# Patient Record
Sex: FEMALE | Race: WHITE | NOT HISPANIC OR LATINO | Employment: FULL TIME | URBAN - METROPOLITAN AREA
[De-identification: names, ages, dates, MRNs, and addresses within clinical notes are randomized per-mention and may not be internally consistent; named-entity substitution may affect disease eponyms.]

---

## 2022-11-27 DIAGNOSIS — G43.109 MIGRAINE WITH AURA AND WITHOUT STATUS MIGRAINOSUS, NOT INTRACTABLE: ICD-10-CM

## 2022-11-28 RX ORDER — GABAPENTIN 100 MG/1
CAPSULE ORAL
Qty: 90 CAPSULE | Refills: 1 | Status: SHIPPED | OUTPATIENT
Start: 2022-11-28

## 2023-01-04 ENCOUNTER — OFFICE VISIT (OUTPATIENT)
Dept: GASTROENTEROLOGY | Facility: CLINIC | Age: 48
End: 2023-01-04

## 2023-01-04 VITALS
HEART RATE: 69 BPM | WEIGHT: 144 LBS | HEIGHT: 64 IN | SYSTOLIC BLOOD PRESSURE: 105 MMHG | DIASTOLIC BLOOD PRESSURE: 68 MMHG | OXYGEN SATURATION: 98 % | TEMPERATURE: 97.9 F | BODY MASS INDEX: 24.59 KG/M2

## 2023-01-04 DIAGNOSIS — Z12.12 ENCOUNTER FOR COLORECTAL CANCER SCREENING: ICD-10-CM

## 2023-01-04 DIAGNOSIS — Z12.11 ENCOUNTER FOR COLORECTAL CANCER SCREENING: ICD-10-CM

## 2023-01-04 DIAGNOSIS — K21.9 GASTROESOPHAGEAL REFLUX DISEASE WITHOUT ESOPHAGITIS: ICD-10-CM

## 2023-01-04 DIAGNOSIS — Z83.71 FAMILY HISTORY OF COLONIC POLYPS: ICD-10-CM

## 2023-01-04 DIAGNOSIS — K59.00 CONSTIPATION, UNSPECIFIED CONSTIPATION TYPE: Primary | ICD-10-CM

## 2023-01-04 PROBLEM — Z83.719 FAMILY HISTORY OF COLONIC POLYPS: Status: ACTIVE | Noted: 2023-01-04

## 2023-01-04 RX ORDER — LINACLOTIDE 145 UG/1
1 CAPSULE, GELATIN COATED ORAL DAILY
Qty: 30 CAPSULE | Refills: 5 | Status: SHIPPED | OUTPATIENT
Start: 2023-01-04 | End: 2023-02-03

## 2023-01-04 NOTE — ASSESSMENT & PLAN NOTE
Gastroesophageal reflux disease - Patient has the symptoms of chronic acid reflux for a long time  Possible hiatal hernia or LES weakness  Should rule out Lockhart's esophagus because of chronic symptoms         -Patient was explained about the lifestyle and dietary modifications  Advised to avoid fatty foods, chocolates, caffeine, alcohol and any other triggering foods  Avoid eating for at least 3 hours before going to bed          -  Schedule for EGD    - may continue with Pepcid

## 2023-01-04 NOTE — ASSESSMENT & PLAN NOTE
Patient with history of chronic constipation which appear to be idiopathic and secondary to IBS  Patient tried MiraLax and fiber supplements without any help  -  Will try Linzess 145 mcg daily and adjust the dose depending on the results     -Schedule for colonoscopy  -High-fiber diet and take plenty of liquids     -Patient was given instructions about the colonoscopy prep     -Patient was explained about  the risks and benefits of the procedure  Risks including but not limited to bleeding, infection, perforation were explained in detail  Also explained about less than 100% sensitivity with the exam and other alternatives

## 2023-01-04 NOTE — PATIENT INSTRUCTIONS
Scheduled date of EGD/colonoscopy (as of today): 02/10/23  Physician performing EGD/colonoscopy: JERSEY  [facility]  Desired bowel prep reviewed with patient: GOLYTELY  Instructions reviewed with patient by: VIRGINIA  Clearances: RENE

## 2023-01-04 NOTE — PROGRESS NOTES
Consultation - [facility]) [facility]  Brenda Vazquezenrico 1975 female         Chief Complaint:   GERD, for colonoscopy    HPI:   78-year-old female with history of depression, GERD was referred for colonoscopy  Patient had colonoscopy many years ago  Her sister had colon polyps and grandfather had colon cancer  She has problems with chronic constipation and reports having bowel movements only once a week  She tried MiraLax, stool softeners and fiber supplements without any help  She gives history of chronic acid reflux and takes Pepcid OTC  Denies any difficulty swallowing  Good appetite, no recent weight loss  Denies abdominal pain, nausea or vomiting  Chaperon: Ms Lui Sierra: Review of Systems   Constitutional: Negative for activity change, appetite change, chills, diaphoresis, fatigue, fever and unexpected weight change  HENT: Negative for ear discharge, ear pain, facial swelling, hearing loss, nosebleeds, sore throat, tinnitus and voice change  Eyes: Negative for pain, discharge, redness, itching and visual disturbance  Respiratory: Negative for apnea, cough, chest tightness, shortness of breath and wheezing  Cardiovascular: Negative for chest pain and palpitations  Gastrointestinal:        As noted in HPI   Endocrine: Negative for cold intolerance, heat intolerance and polyuria  Genitourinary: Negative for difficulty urinating, dysuria, flank pain, hematuria and urgency  Musculoskeletal: Negative for arthralgias, back pain, gait problem, joint swelling and myalgias  Skin: Negative for rash and wound  Neurological: Positive for headaches  Negative for dizziness, tremors, seizures, speech difficulty, light-headedness and numbness  Hematological: Negative for adenopathy  Does not bruise/bleed easily  Psychiatric/Behavioral: Negative for agitation, behavioral problems and confusion  The patient is not nervous/anxious           Past Medical History: Diagnosis Date   • Headache    • Hyperlipidemia    • Migraine    • Psychiatric disorder    • Suicide and self-inflicted injury [facility])       Past Surgical History:   Procedure Laterality Date   • NO PAST SURGERIES       Social History     Socioeconomic History   • Marital status: /Civil Union     Spouse name: Not on file   • Number of children: Not on file   • Years of education: Not on file   • Highest education level: Not on file   Occupational History   • Not on file   Tobacco Use   • Smoking status: Never   • Smokeless tobacco: Never   Vaping Use   • Vaping Use: Never used   Substance and Sexual Activity   • Alcohol use: Yes     Comment: rare   • Drug use: No   • Sexual activity: Not on file   Other Topics Concern   • Not on file   Social History Narrative   • Not on file     Social Determinants of Health     Financial Resource Strain: Not on file   Food Insecurity: Not on file   Transportation Needs: Not on file   Physical Activity: Not on file   Stress: Not on file   Social Connections: Not on file   Intimate Partner Violence: Not on file   Housing Stability: Not on file     Family History   Problem Relation Age of Onset   • COPD Mother    • Heart disease Father    • Cancer Maternal Grandmother    • Cancer Maternal Grandfather    • Colon cancer Maternal Grandfather    • Cancer Maternal Aunt      Patient has no known allergies    Current Outpatient Medications   Medication Sig Dispense Refill   • bisacodyl (DULCOLAX) 5 mg EC tablet Take 2 tablets (10 mg total) by mouth once for 1 dose 2 tablet 0   • lamoTRIgine (LaMICtal) 25 MG CHEW      • linaCLOtide (Linzess) 145 MCG CAPS Take 1 capsule (145 mcg total) by mouth in the morning 30 capsule 5   • LORazepam (ATIVAN) 1 mg tablet Take 1 mg by mouth 2 (two) times a day       • polyethylene glycol (GOLYTELY) 4000 mL solution Take 4,000 mL by mouth once for 1 dose 4000 mL 0   • SUMAtriptan (IMITREX) 100 mg tablet TAKE 1 TABLET BY MOUTH AT ONSET, MAY REPEAT IN 2 HOURS IF NEEDED  DO NOT TAKE MORE THAN 2 TABLETS IN 24 HOURS 9 tablet 6     No current facility-administered medications for this visit  Blood pressure 105/68, pulse 69, temperature 97 9 °F (36 6 °C), height 5' 3 5" (1 613 m), weight 65 3 kg (144 lb), SpO2 98 %, not currently breastfeeding  PHYSICAL EXAM: Physical Exam  Constitutional:       Appearance: Normal appearance  She is well-developed  HENT:      Head: Normocephalic and atraumatic  Nose: Nose normal    Eyes:      Conjunctiva/sclera: Conjunctivae normal    Neck:      Thyroid: No thyromegaly  Vascular: No JVD  Trachea: No tracheal deviation  Cardiovascular:      Rate and Rhythm: Normal rate and regular rhythm  Heart sounds: Normal heart sounds  No murmur heard  No friction rub  No gallop  Pulmonary:      Effort: Pulmonary effort is normal  No respiratory distress  Breath sounds: Normal breath sounds  No wheezing or rales  Abdominal:      General: Bowel sounds are normal  There is no distension  Palpations: Abdomen is soft  There is no mass  Tenderness: There is no abdominal tenderness  There is no guarding  Hernia: No hernia is present  Musculoskeletal:         General: No tenderness or deformity  Cervical back: Neck supple  Right lower leg: No edema  Left lower leg: No edema  Lymphadenopathy:      Cervical: No cervical adenopathy  Skin:     General: Skin is warm and dry  Findings: No erythema or rash  Neurological:      Mental Status: She is alert and oriented to person, place, and time  Psychiatric:         Mood and Affect: Mood normal          Behavior: Behavior normal          Thought Content:  Thought content normal           Lab Results   Component Value Date    WBC 6 0 09/27/2021    HGB 12 1 09/27/2021    HCT 37 1 09/27/2021    MCV 82 09/27/2021     09/27/2021     Lab Results   Component Value Date    CALCIUM 9 2 05/15/2020    K 4 1 09/27/2021    CO2 21 09/27/2021     (H) 09/27/2021    BUN 15 09/27/2021    CREATININE 0 67 09/27/2021     Lab Results   Component Value Date    ALT 8 09/27/2021    AST 14 09/27/2021    ALKPHOS 42 (L) 05/15/2020     Lab Results   Component Value Date    INR 0 98 08/06/2017    PROTIME 10 3 08/06/2017       No results found  ASSESSMENT & PLAN:    Gastroesophageal reflux disease without esophagitis    Gastroesophageal reflux disease - Patient has the symptoms of chronic acid reflux for a long time  Possible hiatal hernia or LES weakness  Should rule out Lockhart's esophagus because of chronic symptoms         -Patient was explained about the lifestyle and dietary modifications  Advised to avoid fatty foods, chocolates, caffeine, alcohol and any other triggering foods  Avoid eating for at least 3 hours before going to bed  -  Schedule for EGD    - may continue with Pepcid    Constipation   Patient with history of chronic constipation which appear to be idiopathic and secondary to IBS  Patient tried MiraLax and fiber supplements without any help  -  Will try Linzess 145 mcg daily and adjust the dose depending on the results     -Schedule for colonoscopy  -High-fiber diet and take plenty of liquids     -Patient was given instructions about the colonoscopy prep     -Patient was explained about  the risks and benefits of the procedure  Risks including but not limited to bleeding, infection, perforation were explained in detail  Also explained about less than 100% sensitivity with the exam and other alternatives  Family history of colonic polyps   Patient is at increased risks because of family history of colon polyps      -  Colonoscopy

## 2023-01-22 DIAGNOSIS — G43.109 MIGRAINE WITH AURA AND WITHOUT STATUS MIGRAINOSUS, NOT INTRACTABLE: ICD-10-CM

## 2023-01-23 RX ORDER — SUMATRIPTAN 100 MG/1
TABLET, FILM COATED ORAL
Qty: 9 TABLET | Refills: 0 | Status: SHIPPED | OUTPATIENT
Start: 2023-01-23

## 2023-02-08 ENCOUNTER — TELEPHONE (OUTPATIENT)
Dept: GASTROENTEROLOGY | Facility: CLINIC | Age: 48
End: 2023-02-08

## 2023-02-08 NOTE — TELEPHONE ENCOUNTER
Scheduled date of EGD/colonoscopy (as of today):3/14/23  Physician performing EGD/colonoscopy:DR BROWN  Location of EGD/colonoscopy:[facility]  Clearances:  NA    PT RESCHEDULED TO THE ABOVE DATE   WAS UNABLE TO GET A HOLD OF ANYONE AT THE [facility] TO NOTIFY OF CANCELLATION ON 2/10

## 2023-02-09 ENCOUNTER — TELEPHONE (OUTPATIENT)
Dept: GASTROENTEROLOGY | Facility: AMBULARY SURGERY CENTER | Age: 48
End: 2023-02-09

## 2023-03-02 DIAGNOSIS — G43.109 MIGRAINE WITH AURA AND WITHOUT STATUS MIGRAINOSUS, NOT INTRACTABLE: ICD-10-CM

## 2023-03-02 RX ORDER — SUMATRIPTAN 100 MG/1
TABLET, FILM COATED ORAL
Qty: 9 TABLET | Refills: 0 | Status: SHIPPED | OUTPATIENT
Start: 2023-03-02

## 2023-03-13 RX ORDER — SODIUM CHLORIDE, SODIUM LACTATE, POTASSIUM CHLORIDE, CALCIUM CHLORIDE 600; 310; 30; 20 MG/100ML; MG/100ML; MG/100ML; MG/100ML
75 INJECTION, SOLUTION INTRAVENOUS CONTINUOUS
Status: CANCELLED | OUTPATIENT
Start: 2023-03-13

## 2023-03-14 ENCOUNTER — ANESTHESIA EVENT (OUTPATIENT)
Dept: GASTROENTEROLOGY | Facility: AMBULARY SURGERY CENTER | Age: 48
End: 2023-03-14

## 2023-03-14 ENCOUNTER — HOSPITAL ENCOUNTER (OUTPATIENT)
Dept: GASTROENTEROLOGY | Facility: AMBULARY SURGERY CENTER | Age: 48
Setting detail: OUTPATIENT SURGERY
Discharge: HOME | End: 2023-03-14
Attending: INTERNAL MEDICINE

## 2023-03-14 ENCOUNTER — ANESTHESIA (OUTPATIENT)
Dept: GASTROENTEROLOGY | Facility: AMBULARY SURGERY CENTER | Age: 48
End: 2023-03-14

## 2023-03-14 VITALS
SYSTOLIC BLOOD PRESSURE: 116 MMHG | HEART RATE: 61 BPM | RESPIRATION RATE: 18 BRPM | TEMPERATURE: 97.5 F | OXYGEN SATURATION: 94 % | DIASTOLIC BLOOD PRESSURE: 71 MMHG

## 2023-03-14 DIAGNOSIS — K59.00 CONSTIPATION, UNSPECIFIED CONSTIPATION TYPE: ICD-10-CM

## 2023-03-14 DIAGNOSIS — K21.9 GASTROESOPHAGEAL REFLUX DISEASE WITHOUT ESOPHAGITIS: ICD-10-CM

## 2023-03-14 DIAGNOSIS — Z83.71 FAMILY HISTORY OF COLONIC POLYPS: ICD-10-CM

## 2023-03-14 LAB
EXT PREGNANCY TEST URINE: NEGATIVE
EXT. CONTROL: NORMAL

## 2023-03-14 RX ORDER — SODIUM CHLORIDE, SODIUM LACTATE, POTASSIUM CHLORIDE, CALCIUM CHLORIDE 600; 310; 30; 20 MG/100ML; MG/100ML; MG/100ML; MG/100ML
75 INJECTION, SOLUTION INTRAVENOUS CONTINUOUS
Status: DISCONTINUED | OUTPATIENT
Start: 2023-03-14 | End: 2023-03-18 | Stop reason: HOSPADM

## 2023-03-14 RX ORDER — LIDOCAINE HYDROCHLORIDE 10 MG/ML
INJECTION, SOLUTION EPIDURAL; INFILTRATION; INTRACAUDAL; PERINEURAL AS NEEDED
Status: DISCONTINUED | OUTPATIENT
Start: 2023-03-14 | End: 2023-03-14

## 2023-03-14 RX ORDER — PANTOPRAZOLE SODIUM 40 MG/1
40 TABLET, DELAYED RELEASE ORAL DAILY
Qty: 30 TABLET | Refills: 2 | Status: SHIPPED | OUTPATIENT
Start: 2023-03-14

## 2023-03-14 RX ORDER — PROPOFOL 10 MG/ML
INJECTION, EMULSION INTRAVENOUS AS NEEDED
Status: DISCONTINUED | OUTPATIENT
Start: 2023-03-14 | End: 2023-03-14

## 2023-03-14 RX ADMIN — PROPOFOL 140 MG: 10 INJECTION, EMULSION INTRAVENOUS at 11:56

## 2023-03-14 RX ADMIN — LIDOCAINE HYDROCHLORIDE 50 MG: 10 INJECTION, SOLUTION EPIDURAL; INFILTRATION; INTRACAUDAL; PERINEURAL at 11:56

## 2023-03-14 RX ADMIN — PROPOFOL 50 MG: 10 INJECTION, EMULSION INTRAVENOUS at 12:03

## 2023-03-14 RX ADMIN — SODIUM CHLORIDE, SODIUM LACTATE, POTASSIUM CHLORIDE, AND CALCIUM CHLORIDE: .6; .31; .03; .02 INJECTION, SOLUTION INTRAVENOUS at 11:23

## 2023-03-14 RX ADMIN — PROPOFOL 50 MG: 10 INJECTION, EMULSION INTRAVENOUS at 12:11

## 2023-03-14 RX ADMIN — PROPOFOL 50 MG: 10 INJECTION, EMULSION INTRAVENOUS at 12:00

## 2023-03-14 NOTE — ANESTHESIA PREPROCEDURE EVALUATION
Procedure:  COLONOSCOPY  EGD    Relevant Problems   ANESTHESIA (within normal limits)   (-) History of anesthesia complications      CARDIO   (+) Elevated triglycerides with high cholesterol   (+) Migraine with aura and without status migrainosus, not intractable   (+) Mixed hyperlipidemia      ENDO (within normal limits)      GI/HEPATIC   (+) Gastroesophageal reflux disease without esophagitis      /RENAL (within normal limits)      GYN (within normal limits)      HEMATOLOGY (within normal limits)      MUSCULOSKELETAL (within normal limits)      NEURO/PSYCH   (+) Depression with anxiety   (+) Migraine with aura and without status migrainosus, not intractable      PULMONARY (within normal limits)      Endocrine   (+) MTHFR mutation      Nervous and Auditory   (+) Sensorineural hearing loss (SNHL), bilateral        Physical Exam    Airway    Mallampati score: II  TM Distance: >3 FB  Neck ROM: full     Dental   No notable dental hx     Cardiovascular  Rhythm: regular, Rate: normal, Cardiovascular exam normal    Pulmonary  Pulmonary exam normal Breath sounds clear to auscultation,     Other Findings        Anesthesia Plan  ASA Score- 2     Anesthesia Type- IV sedation with anesthesia with ASA Monitors  Additional Monitors:   Airway Plan:           Plan Factors-Exercise tolerance (METS): >4 METS  Chart reviewed  EKG reviewed  Imaging results reviewed  Existing labs reviewed  Patient summary reviewed  Patient is not a current smoker  Patient did not smoke on day of surgery  Induction- intravenous  Postoperative Plan-     Informed Consent- Anesthetic plan and risks discussed with patient  I personally reviewed this patient with the CRNA  Discussed and agreed on the Anesthesia Plan with the CRNA           NPO verified  NKDA  Urine pregnancy test negative today, 3/14/23      Plan:  IV sedation/MAC, GA as backup    Benefits and risks of sedation were discussed with the patient including possibility of recall under sedation and the potential for conversion to general anesthesia if necessary  All questions were answered  Anesthesia consent was obtained from the patient

## 2023-03-14 NOTE — H&P
History and Physical -  [facility]  Anayarikelley Monge 50 y o  female MRN: [id number]        HPI: 47-year-old female with history of GERD has issues with chronic constipation  Her sister had colon polyps  Historical Information   Past Medical History:   Diagnosis Date   • Constipation    • Family history of colonic polyps    • GERD (gastroesophageal reflux disease)    • Headache    • Hyperlipidemia    • Migraine    • Psychiatric disorder    • Suicide and self-inflicted injury [facility])      Past Surgical History:   Procedure Laterality Date   • COLONOSCOPY     • NO PAST SURGERIES       Social History   Social History     Substance and Sexual Activity   Alcohol Use Not Currently    Comment: rare     Social History     Substance and Sexual Activity   Drug Use No     Social History     Tobacco Use   Smoking Status Never   Smokeless Tobacco Never     Family History   Problem Relation Age of Onset   • COPD Mother    • Heart disease Father    • Cancer Maternal Grandmother    • Cancer Maternal Grandfather    • Colon cancer Maternal Grandfather    • Cancer Maternal Aunt        Meds/Allergies     (Not in a hospital admission)      No Known Allergies    Objective     Last menstrual period 02/24/2023, not currently breastfeeding      PHYSICAL EXAM:    Gen: NAD  CV: S1 & S2 normal, RRR  CHEST: Clear to auscultate  ABD: soft, NT/ND, good bowel sounds  EXT: no edema    ASSESSMENT:     GERD, constipation, family history of colon polyps    PLAN:    EGD and colonoscopy

## 2023-03-28 DIAGNOSIS — A04.8 H. PYLORI INFECTION: Primary | ICD-10-CM

## 2023-03-28 RX ORDER — PANTOPRAZOLE SODIUM 40 MG/1
40 TABLET, DELAYED RELEASE ORAL
Qty: 28 TABLET | Refills: 0 | Status: SHIPPED | OUTPATIENT
Start: 2023-03-28 | End: 2023-04-11

## 2023-03-28 RX ORDER — CLARITHROMYCIN 500 MG/1
500 TABLET, COATED ORAL EVERY 12 HOURS SCHEDULED
Qty: 28 TABLET | Refills: 0 | Status: SHIPPED | OUTPATIENT
Start: 2023-03-28 | End: 2023-04-11

## 2023-03-28 RX ORDER — AMOXICILLIN 500 MG/1
1000 CAPSULE ORAL EVERY 12 HOURS SCHEDULED
Qty: 56 CAPSULE | Refills: 0 | Status: SHIPPED | OUTPATIENT
Start: 2023-03-28 | End: 2023-04-11

## 2023-03-28 NOTE — TELEPHONE ENCOUNTER
Spoke with patient, reviewed biopsy results and recommendations  Explained h pylori, antibx regimen and to have stool sample submitted 1 month after finishing antibx and to hold PPI for 2 weeks prior  Patient verbalized understanding, no further questions

## 2023-03-28 NOTE — TELEPHONE ENCOUNTER
----- Message from Antony Montoya MD sent at 3/27/2023  6:07 PM EDT -----  RN to call pt    Gastric biopsies came back as positive for H  pylori infection   Patient to be treated with triple therapy, and then check stool for H  pylori antigen in couple of months off PPI

## 2023-03-28 NOTE — TELEPHONE ENCOUNTER
Patient returning missed phone call  Call transferred to [facility] at the ph # provided via Teams

## 2023-04-06 ENCOUNTER — NURSE TRIAGE (OUTPATIENT)
Dept: OTHER | Facility: OTHER | Age: 48
End: 2023-04-06

## 2023-04-06 NOTE — TELEPHONE ENCOUNTER
"Regarding: Yeast Infection  ----- Message from Jennifer Feng sent at 4/6/2023  9:24 AM EDT -----  \" I am taking an antibiotic for H Pylori, I now have a Yeast infection  Can I get the medication for the Yeast infection  \"    "

## 2023-04-06 NOTE — TELEPHONE ENCOUNTER
Spoke with patient, advised she has to follow up with ob/gyn or PCP for medication for yeast infection we do not prescribe that  She has appt tomorrow with PCP  No further questions

## 2023-04-06 NOTE — TELEPHONE ENCOUNTER
"Patient currently being treated for H pylori infections with Amoxicillin and Clarithromycin since 3/28/23  Patient reports vaginal itching and whitish discharge starting Tuesday 4/4  Patient reports using OTC medication with no relief and is requesting oral antibiotic for yeast infection  Contact with PCP and GYN requires OV  Please follow up with patient  Reason for Disposition  • Caller has NON-URGENT question and triager unable to answer question    Answer Assessment - Initial Assessment Questions  1  INFECTION: \"What infection is the antibiotic being given for? \"      H pylori  2  ANTIBIOTIC: \"What antibiotic are you taking\" \"How many times per day? \"      Amoxicillin 500 mg and Clarithromycin 500 mg every 12 hours  3  DURATION: \"When was the antibiotic started? \"      Start 3/28 for 14 days  4  MAIN CONCERN OR SYMPTOM:  \"What is your main concern right now? \"      Signs and symptoms of yeast infection with  Itching and wihitish discharge  5  BETTER-SAME-WORSE: Roselind Orange you getting better, staying the same, or getting worse compared to when you first started the antibiotics? \" If getting worse, ask: \"In what way? \"       Better  6  FEVER: \"Do you have a fever? \" If Yes, ask: \"What is your temperature, how was it measured, and when did it start? \"     Denies  7  SYMPTOMS: \"Are there any other symptoms you're concerned about? \" If Yes, ask: \"When did it start? \"      Started Tuesday 4/4; tried OTC with no results  8  FOLLOW-UP APPOINTMENT: \"Do you have a follow-up appointment with your doctor? \"      Not asked    Protocols used: INFECTION ON ANTIBIOTIC FOLLOW-UP CALL-ADULT-[place]    "

## 2023-04-07 ENCOUNTER — OFFICE VISIT (OUTPATIENT)
Dept: FAMILY MEDICINE CLINIC | Facility: CLINIC | Age: 48
End: 2023-04-07

## 2023-04-07 VITALS
BODY MASS INDEX: 24.21 KG/M2 | RESPIRATION RATE: 18 BRPM | HEART RATE: 76 BPM | DIASTOLIC BLOOD PRESSURE: 74 MMHG | WEIGHT: 141.8 LBS | TEMPERATURE: 98.1 F | SYSTOLIC BLOOD PRESSURE: 110 MMHG | HEIGHT: 64 IN

## 2023-04-07 DIAGNOSIS — R55 SYNCOPE, UNSPECIFIED SYNCOPE TYPE: ICD-10-CM

## 2023-04-07 DIAGNOSIS — G43.109 MIGRAINE WITH AURA AND WITHOUT STATUS MIGRAINOSUS, NOT INTRACTABLE: ICD-10-CM

## 2023-04-07 DIAGNOSIS — Z12.31 ENCOUNTER FOR SCREENING MAMMOGRAM FOR MALIGNANT NEOPLASM OF BREAST: ICD-10-CM

## 2023-04-07 DIAGNOSIS — Z00.00 ANNUAL PHYSICAL EXAM: Primary | ICD-10-CM

## 2023-04-07 DIAGNOSIS — R43.1 ABNORMAL SMELL: ICD-10-CM

## 2023-04-07 DIAGNOSIS — B37.31 YEAST VAGINITIS: ICD-10-CM

## 2023-04-07 RX ORDER — SUMATRIPTAN 100 MG/1
TABLET, FILM COATED ORAL
Qty: 9 TABLET | Refills: 6 | Status: SHIPPED | OUTPATIENT
Start: 2023-04-07

## 2023-04-07 RX ORDER — FLUCONAZOLE 150 MG/1
150 TABLET ORAL
Qty: 2 TABLET | Refills: 0 | Status: SHIPPED | OUTPATIENT
Start: 2023-04-07 | End: 2023-04-11

## 2023-04-07 NOTE — PROGRESS NOTES
Chief Complaint   Patient presents with   • Annual Exam   • Vaginitis        Patient ID: Ramiro Funk is a 50 y o  female  HPI  Pt is seeing for annual PE  -  UTD with Gyn exams     The following portions of the patient's history were reviewed and updated as appropriate: allergies, current medications, past family history, past medical history, past social history, past surgical history and problem list     Review of Systems   Constitutional: Negative for fatigue, fever and unexpected weight change  HENT: Negative for congestion, ear discharge, ear pain, hearing loss, postnasal drip, rhinorrhea, sinus pressure, sore throat and trouble swallowing  Episodes of abnormal smell  -  Started 2 m ago -  Normal CT head    Eyes: Negative  Respiratory: Negative  Cardiovascular: Negative  Gastrointestinal: Negative for abdominal pain, blood in stool, constipation, diarrhea, nausea and vomiting  Frequent heartburns    Endocrine: Negative  Genitourinary: Positive for vaginal discharge (after AB use  -  tried Monistat OTC with minimal help)  Musculoskeletal: Negative  Skin: Negative  Neurological: Positive for syncope (4 random episodes in last 2 y -  no prior w/u ) and headaches (has migraines )  Negative for dizziness, weakness, light-headedness and numbness  Hematological: Negative  Psychiatric/Behavioral: Negative          Current Outpatient Medications   Medication Sig Dispense Refill   • amoxicillin (AMOXIL) 500 mg capsule Take 2 capsules (1,000 mg total) by mouth every 12 (twelve) hours for 14 days 56 capsule 0   • clarithromycin (BIAXIN) 500 mg tablet Take 1 tablet (500 mg total) by mouth every 12 (twelve) hours for 14 days 28 tablet 0   • lamoTRIgine (LaMICtal) 25 MG CHEW daily at bedtime     • LORazepam (ATIVAN) 1 mg tablet Take 1 mg by mouth 2 (two) times a day       • pantoprazole (PROTONIX) 40 mg tablet Take 1 tablet (40 mg total) by mouth daily 30 tablet 2   • SUMAtriptan "(IMITREX) 100 mg tablet TAKE 1 TABLET BY MOUTH AT ONSET, MAY REPEAT IN 2 HOURS IF NEEDED  DO NOT TAKE MORE THAN 2 TABLETS IN 24 HOURS 9 tablet 0   • bisacodyl (DULCOLAX) 5 mg EC tablet Take 2 tablets (10 mg total) by mouth once for 1 dose 2 tablet 0   • pantoprazole (PROTONIX) 40 mg tablet Take 1 tablet (40 mg total) by mouth 2 (two) times a day before meals for 14 days (Patient not taking: Reported on 4/7/2023) 28 tablet 0   • polyethylene glycol (GOLYTELY) 4000 mL solution Take 4,000 mL by mouth once for 1 dose 4000 mL 0     No current facility-administered medications for this visit  Objective:    /74 (BP Location: Left arm, Patient Position: Sitting, Cuff Size: Standard)   Pulse 76   Temp 98 1 °F (36 7 °C)   Resp 18   Ht 5' 3 5\" (1 613 m)   Wt 64 3 kg (141 lb 12 8 oz)   BMI 24 72 kg/m²        Physical Exam  Constitutional:       General: She is not in acute distress  Appearance: She is well-developed  She is not ill-appearing  HENT:      Head: Normocephalic and atraumatic  Right Ear: Hearing, tympanic membrane, ear canal and external ear normal       Left Ear: Hearing, tympanic membrane, ear canal and external ear normal       Nose: No congestion or rhinorrhea  Mouth/Throat:      Pharynx: No oropharyngeal exudate or posterior oropharyngeal erythema  Eyes:      Extraocular Movements: Extraocular movements intact  Conjunctiva/sclera: Conjunctivae normal    Neck:      Thyroid: No thyroid mass or thyromegaly  Vascular: No JVD  Cardiovascular:      Rate and Rhythm: Normal rate and regular rhythm  Heart sounds: Normal heart sounds  No murmur heard  No gallop  Pulmonary:      Effort: No respiratory distress  Breath sounds: Normal breath sounds  No wheezing, rhonchi or rales  Abdominal:      General: Bowel sounds are normal       Palpations: Abdomen is soft  Tenderness: There is no abdominal tenderness   There is no right CVA tenderness or left CVA " tenderness  Musculoskeletal:      Cervical back: Normal range of motion and neck supple  Right lower leg: No edema  Left lower leg: No edema  Lymphadenopathy:      Cervical: No cervical adenopathy  Skin:     Coloration: Skin is not pale  Findings: No rash  Neurological:      General: No focal deficit present  Mental Status: She is alert and oriented to person, place, and time  Cranial Nerves: No cranial nerve deficit  Psychiatric:         Mood and Affect: Mood normal          Behavior: Behavior normal          Thought Content: Thought content normal          Judgment: Judgment normal                  Assessment/Plan:         Diagnoses and all orders for this visit:    Annual physical exam  -     CBC; Future  -     Comprehensive metabolic panel; Future  -     Lipid panel; Future  -     TSH, 3rd generation; Future  -     Hemoglobin A1C; Future  -     CBC  -     Comprehensive metabolic panel  -     Lipid panel  -     TSH, 3rd generation  -     Hemoglobin A1C    Encounter for screening mammogram for malignant neoplasm of breast  -     Mammo screening bilateral w 3d & cad; Future    Yeast vaginitis  -     fluconazole (DIFLUCAN) 150 mg tablet; Take 1 tablet (150 mg total) by mouth every third day for 2 doses    Migraine with aura and without status migrainosus, not intractable  -     SUMAtriptan (IMITREX) 100 mg tablet; TAKE 1 TABLET BY MOUTH AT ONSET, MAY REPEAT IN 2 HOURS IF NEEDED  DO NOT TAKE MORE THAN 2 TABLETS IN 24 HOURS    Abnormal smell  -     Ambulatory Referral to Otolaryngology; Future    Syncope, unspecified syncope type  -     Ambulatory Referral to Neurology; Future  -     Ambulatory Referral to Cardiology;  Future        rto in 1 y                 Leslie Mclean MD

## 2023-04-26 LAB
ALBUMIN SERPL-MCNC: 4.5 G/DL (ref 3.8–4.8)
ALBUMIN/GLOB SERPL: 2.1 {RATIO} (ref 1.2–2.2)
ALP SERPL-CCNC: 39 IU/L (ref 44–121)
ALT SERPL-CCNC: 9 IU/L (ref 0–32)
AST SERPL-CCNC: 18 IU/L (ref 0–40)
BILIRUB SERPL-MCNC: 0.3 MG/DL (ref 0–1.2)
BUN SERPL-MCNC: 18 MG/DL (ref 6–24)
BUN/CREAT SERPL: 25 (ref 9–23)
CALCIUM SERPL-MCNC: 9.3 MG/DL (ref 8.7–10.2)
CHLORIDE SERPL-SCNC: 108 MMOL/L (ref 96–106)
CHOLEST SERPL-MCNC: 236 MG/DL (ref 100–199)
CHOLEST/HDLC SERPL: 3.4 RATIO (ref 0–4.4)
CO2 SERPL-SCNC: 25 MMOL/L (ref 20–29)
CREAT SERPL-MCNC: 0.71 MG/DL (ref 0.57–1)
EGFR: 104 ML/MIN/1.73
ERYTHROCYTE [DISTWIDTH] IN BLOOD BY AUTOMATED COUNT: 12.3 % (ref 11.7–15.4)
EST. AVERAGE GLUCOSE BLD GHB EST-MCNC: 103 MG/DL
GLOBULIN SER-MCNC: 2.1 G/DL (ref 1.5–4.5)
GLUCOSE SERPL-MCNC: 90 MG/DL (ref 70–99)
HBA1C MFR BLD: 5.2 % (ref 4.8–5.6)
HCT VFR BLD AUTO: 37.3 % (ref 34–46.6)
HDLC SERPL-MCNC: 69 MG/DL
HGB BLD-MCNC: 12 G/DL (ref 11.1–15.9)
LDLC SERPL CALC-MCNC: 154 MG/DL (ref 0–99)
MCH RBC QN AUTO: 27.2 PG (ref 26.6–33)
MCHC RBC AUTO-ENTMCNC: 32.2 G/DL (ref 31.5–35.7)
MCV RBC AUTO: 85 FL (ref 79–97)
MICRODELETION SYND BLD/T FISH: NORMAL
PLATELET # BLD AUTO: 220 X10E3/UL (ref 150–450)
POTASSIUM SERPL-SCNC: 4.2 MMOL/L (ref 3.5–5.2)
PROT SERPL-MCNC: 6.6 G/DL (ref 6–8.5)
RBC # BLD AUTO: 4.41 X10E6/UL (ref 3.77–5.28)
SL AMB VLDL CHOLESTEROL CALC: 13 MG/DL (ref 5–40)
SODIUM SERPL-SCNC: 144 MMOL/L (ref 134–144)
TRIGL SERPL-MCNC: 77 MG/DL (ref 0–149)
TSH SERPL DL<=0.005 MIU/L-ACNC: 1.51 UIU/ML (ref 0.45–4.5)
WBC # BLD AUTO: 4.9 X10E3/UL (ref 3.4–10.8)

## 2023-05-25 ENCOUNTER — OFFICE VISIT (OUTPATIENT)
Dept: URGENT CARE | Facility: CLINIC | Age: 48
End: 2023-05-25

## 2023-05-25 ENCOUNTER — APPOINTMENT (OUTPATIENT)
Dept: RADIOLOGY | Facility: CLINIC | Age: 48
End: 2023-05-25

## 2023-05-25 VITALS
OXYGEN SATURATION: 98 % | HEIGHT: 64 IN | HEART RATE: 68 BPM | WEIGHT: 142 LBS | TEMPERATURE: 97.6 F | SYSTOLIC BLOOD PRESSURE: 119 MMHG | DIASTOLIC BLOOD PRESSURE: 66 MMHG | RESPIRATION RATE: 15 BRPM | BODY MASS INDEX: 24.24 KG/M2

## 2023-05-25 DIAGNOSIS — M79.671 RIGHT FOOT PAIN: ICD-10-CM

## 2023-05-25 DIAGNOSIS — M79.671 RIGHT FOOT PAIN: Primary | ICD-10-CM

## 2023-05-25 NOTE — PATIENT INSTRUCTIONS
--Initial read of x-ray negative for fracture  Will call with final results when obtained (anticipate 1-12 hours)  --Rest, avoid excess walking/hiking    --Cast shoe as during walking for the next 1-2 weeks  --Ice, elevate, ACE wrap as needed  --Aleve as needed  Consider also OTC Voltaren gel  --Follow-up with podiatry for ongoing issues over the next 1-2 weeks  May benefit from course of PT and/or injection  Go to ER for worsening

## 2023-05-25 NOTE — PROGRESS NOTES
[facility]        NAME: Celio Perkins is a 50 y o  female  : 1975    MRN: [id number]  DATE: May 25, 2023  TIME: 6:20 PM    Assessment and Plan   Right foot pain [M79 671]  1  Right foot pain  XR foot 3+ vw right    Ambulatory referral to Podiatry        --Suspected extensor digitorum longus tendonitis vs  midfoot sprain  Address per below  Patient Instructions     --Initial read of x-ray negative for fracture  Will call with final results when obtained (anticipate 1-12 hours)  --Rest, avoid excess walking/hiking    --Cast/ortho shoe as during walking for the next 1-2 weeks  --Ice, elevate, ACE wrap as needed  --Aleve as needed  Consider also OTC Voltaren gel  --Follow-up with podiatry for ongoing issues over the next 1-2 weeks  May benefit from course of PT and/or injection  Go to ER for worsening  Chief Complaint     Chief Complaint   Patient presents with   • Foot Pain     Pt is reporting right foot pain and edema- she denies any injury and she stated that they went hiking and noticed it was increasing in pain  History of Present Illness       Here with complaints of right foot pain x 2 weeks  Started shortly after going on 7 mile hike  No known injury or trauma  Wearing comfortable hiking shoes  Has continued since then, despite no further long hikes  Does walk regularly throughout the day, however  Wears comfortable sneakers  No boots or steel-toed shoes  Dorsal aspect of midfoot only with no radiation elsewhere including forefoot, arch, ankle, heel, Achilles  Mild swelling at times  Possible bruising, not sure  Rates pain 5/10 at present, 7/10 with weight bearing  Partial relief from Aleve, ice  Left foot feels fine  Denies past foot injuries/issues  Review of Systems   Review of Systems   Constitutional: Negative for fever  Musculoskeletal:        Per HPI   Neurological: Negative for numbness           Current Medications "      Current Outpatient Medications:   •  bisacodyl (DULCOLAX) 5 mg EC tablet, Take 2 tablets (10 mg total) by mouth once for 1 dose, Disp: 2 tablet, Rfl: 0  •  lamoTRIgine (LaMICtal) 25 MG CHEW, daily at bedtime, Disp: , Rfl:   •  LORazepam (ATIVAN) 1 mg tablet, Take 1 mg by mouth 2 (two) times a day  , Disp: , Rfl:   •  pantoprazole (PROTONIX) 40 mg tablet, Take 1 tablet (40 mg total) by mouth daily, Disp: 30 tablet, Rfl: 2  •  pantoprazole (PROTONIX) 40 mg tablet, Take 1 tablet (40 mg total) by mouth 2 (two) times a day before meals for 14 days (Patient not taking: Reported on 4/7/2023), Disp: 28 tablet, Rfl: 0  •  polyethylene glycol (GOLYTELY) 4000 mL solution, Take 4,000 mL by mouth once for 1 dose, Disp: 4000 mL, Rfl: 0  •  SUMAtriptan (IMITREX) 100 mg tablet, TAKE 1 TABLET BY MOUTH AT ONSET, MAY REPEAT IN 2 HOURS IF NEEDED  DO NOT TAKE MORE THAN 2 TABLETS IN 24 HOURS, Disp: 9 tablet, Rfl: 6    Current Allergies     Allergies as of 05/25/2023   • (No Known Allergies)            The following portions of the patient's history were reviewed and updated as appropriate: allergies, current medications, past family history, past medical history, past social history, past surgical history and problem list      Past Medical History:   Diagnosis Date   • Constipation    • Family history of colonic polyps    • GERD (gastroesophageal reflux disease)    • Headache    • Hyperlipidemia    • Migraine    • Psychiatric disorder    • Suicide and self-inflicted injury [facility])        Past Surgical History:   Procedure Laterality Date   • COLONOSCOPY     • NO PAST SURGERIES         Family History   Problem Relation Age of Onset   • COPD Mother    • Heart disease Father    • Cancer Maternal Grandmother    • Cancer Maternal Grandfather    • Colon cancer Maternal Grandfather    • Cancer Maternal Aunt          Medications have been verified          Objective   /66   Pulse 68   Temp 97 6 °F (36 4 °C)   Resp 15   Ht 5' 3 5\" " (1 613 m)   Wt 64 4 kg (142 lb)   SpO2 98%   BMI 24 76 kg/m²   No LMP recorded  Physical Exam     Physical Exam  Pulmonary:      Effort: Pulmonary effort is normal    Musculoskeletal:         General: Swelling and tenderness present  No deformity  Normal range of motion  Comments: Right foot TTP overlying dorsal aspect of midfoot (2nd-4th metatarsals primarily) with associated trace swelling, no bruising  No palpable abnormality  Remainder of right foot and ankle non-tender with normal appearance including first and 5th metatarsals, navicular  Normal ankle AROM with some pain at limits of plantar flexion and with resisted dorsiflexion  Toes with normal temp, color, sensation  Minimally antalgic gait  Neurological:      Mental Status: She is alert  Psychiatric:         Mood and Affect: Mood normal          Orthopedic injury treatment    Date/Time: 5/25/2023 5:30 PM    Performed by: JAGDEEP Hernández  Authorized by: JAGDEEP Hernández    Patient Location:  [facility]  [place] Protocol:  Procedure performed by:  Consent: Verbal consent obtained  Risks and benefits: risks, benefits and alternatives were discussed  Consent given by: patient  Patient understanding: patient states understanding of the procedure being performed  Patient consent: the patient's understanding of the procedure matches consent given  Procedure consent: procedure consent matches procedure scheduled  Required items: required blood products, implants, devices, and special equipment available  Patient identity confirmed: verbally with patient      Injury location:  Foot  Location details:  Right foot  Injury type:   Soft tissue  Neurovascular status: Neurovascularly intact    Distal perfusion: normal    Neurological function: normal    Range of motion: normal    Local anesthesia used?: No    Immobilization:  Ace wrap (Cast/ortho shoe)  Neurovascular status: Neurovascularly intact    Distal perfusion: normal Neurological function: normal    Range of motion: unchanged    Patient tolerance:  Patient tolerated the procedure well with no immediate complications

## 2023-05-31 DIAGNOSIS — Z12.31 ENCOUNTER FOR SCREENING MAMMOGRAM FOR MALIGNANT NEOPLASM OF BREAST: ICD-10-CM

## 2023-06-06 ENCOUNTER — CONSULT (OUTPATIENT)
Dept: CARDIOLOGY CLINIC | Facility: CLINIC | Age: 48
End: 2023-06-06
Payer: COMMERCIAL

## 2023-06-06 VITALS
HEIGHT: 64 IN | HEART RATE: 71 BPM | WEIGHT: 146 LBS | OXYGEN SATURATION: 98 % | BODY MASS INDEX: 24.92 KG/M2 | SYSTOLIC BLOOD PRESSURE: 98 MMHG | DIASTOLIC BLOOD PRESSURE: 64 MMHG

## 2023-06-06 DIAGNOSIS — E78.2 MIXED HYPERLIPIDEMIA: ICD-10-CM

## 2023-06-06 DIAGNOSIS — Z15.89 MTHFR MUTATION: ICD-10-CM

## 2023-06-06 DIAGNOSIS — K21.9 GASTROESOPHAGEAL REFLUX DISEASE WITHOUT ESOPHAGITIS: ICD-10-CM

## 2023-06-06 DIAGNOSIS — R55 SYNCOPE, UNSPECIFIED SYNCOPE TYPE: Primary | ICD-10-CM

## 2023-06-06 PROCEDURE — 99214 OFFICE O/P EST MOD 30 MIN: CPT | Performed by: INTERNAL MEDICINE

## 2023-06-06 PROCEDURE — 93000 ELECTROCARDIOGRAM COMPLETE: CPT | Performed by: INTERNAL MEDICINE

## 2023-06-06 RX ORDER — ROSUVASTATIN CALCIUM 10 MG/1
10 TABLET, COATED ORAL
Qty: 90 TABLET | Refills: 1 | Status: SHIPPED | OUTPATIENT
Start: 2023-06-06

## 2023-06-06 RX ORDER — PANTOPRAZOLE SODIUM 40 MG/1
TABLET, DELAYED RELEASE ORAL
Qty: 30 TABLET | Refills: 2 | Status: SHIPPED | OUTPATIENT
Start: 2023-06-06

## 2023-06-06 NOTE — PROGRESS NOTES
[facility]  [address] [place] [address] [address]   [place], [address]  Cardiology Consultation    Clarence Ricardo  [id number]  1975      Consult for:  Appreciate consult by: Catrachito Savage MD    1  Syncope, unspecified syncope type  POCT ECG    Ambulatory Referral to Cardiology    Compression Stocking    rosuvastatin (CRESTOR) 10 MG tablet    Lipid Panel with Direct LDL reflex    Lipid Panel with Direct LDL reflex      2  MTHFR mutation  POCT ECG      3  Mixed hyperlipidemia  POCT ECG    rosuvastatin (CRESTOR) 10 MG tablet    Lipid Panel with Direct LDL reflex    Lipid Panel with Direct LDL reflex         Discussion/Summary:   Syncope-she has a classic prodrome of feeling unwell, tunnel vision prior to episodes  Possible vasovagal  very infrequent events  Her resting twelve-lead EKG shows normal sinus rhythm without acute ST-T wave changes  Normal auscultatory exam examination  Her orthostatics here were negative  We discussed about behavior modifications  She will continue to hydrate herself  She will wear compression when sitting and standing  She will eat small meals throughout the day  Cannot completely rule out postictal symptoms-possible neurologic? She does have a history of migraines  Possible complex migraines? Hyperlipidemia-her LDL is elevated  She had a prior advanced lipid panel  We will target an LDL below 70  She will restart rosuvastatin 10 mg    HPI:   42-year-old with a history of hyperlipidemia presents with periodic syncopal episodes  She states having infrequent episodes of feeling unwell, tunnel vision, palpitation and then quick syncope  Her partner states she is staring out and knows it is coming  No prior history of seizures  Denies it associated with migraines  Denies having chest heaviness  She exercises daily without limitations  Denies having exertional shortness of breath  Denies PND orthopnea      Past Medical History: Diagnosis Date   • Constipation    • Family history of colonic polyps    • GERD (gastroesophageal reflux disease)    • Headache    • Hyperlipidemia    • Migraine    • Psychiatric disorder    • Suicide and self-inflicted injury [facility])      Social History     Socioeconomic History   • Marital status: /Civil Union     Spouse name: Not on file   • Number of children: Not on file   • Years of education: Not on file   • Highest education level: Not on file   Occupational History   • Not on file   Tobacco Use   • Smoking status: Never   • Smokeless tobacco: Never   Vaping Use   • Vaping Use: Never used   Substance and Sexual Activity   • Alcohol use: Not Currently     Comment: rare   • Drug use: No   • Sexual activity: Not on file   Other Topics Concern   • Not on file   Social History Narrative   • Not on file     Social Determinants of Health     Financial Resource Strain: Not on file   Food Insecurity: Not on file   Transportation Needs: Not on file   Physical Activity: Not on file   Stress: Not on file   Social Connections: Not on file   Intimate Partner Violence: Not on file   Housing Stability: Not on file      Family History   Problem Relation Age of Onset   • COPD Mother    • Heart disease Father    • Cancer Maternal Grandmother    • Cancer Maternal Grandfather    • Colon cancer Maternal Grandfather    • Cancer Maternal Aunt      Past Surgical History:   Procedure Laterality Date   • COLONOSCOPY     • NO PAST SURGERIES         Current Outpatient Medications:   •  lamoTRIgine (LaMICtal) 25 MG CHEW, daily at bedtime, Disp: , Rfl:   •  LORazepam (ATIVAN) 1 mg tablet, Take 1 mg by mouth 2 (two) times a day  , Disp: , Rfl:   •  SUMAtriptan (IMITREX) 100 mg tablet, TAKE 1 TABLET BY MOUTH AT ONSET, MAY REPEAT IN 2 HOURS IF NEEDED   DO NOT TAKE MORE THAN 2 TABLETS IN 24 HOURS, Disp: 9 tablet, Rfl: 6  •  bisacodyl (DULCOLAX) 5 mg EC tablet, Take 2 tablets (10 mg total) by mouth once for 1 dose, Disp: 2 tablet, Rfl: "0  •  pantoprazole (PROTONIX) 40 mg tablet, Take 1 tablet (40 mg total) by mouth 2 (two) times a day before meals for 14 days (Patient not taking: Reported on 4/7/2023), Disp: 28 tablet, Rfl: 0  •  pantoprazole (PROTONIX) 40 mg tablet, TAKE ONE TABLET BY MOUTH EVERY DAY (Patient not taking: Reported on 6/6/2023), Disp: 30 tablet, Rfl: 2  •  polyethylene glycol (GOLYTELY) 4000 mL solution, Take 4,000 mL by mouth once for 1 dose, Disp: 4000 mL, Rfl: 0  No Known Allergies  Vitals:    06/06/23 1534 06/06/23 1542 06/06/23 1545   BP: 108/62 98/68 98/64   BP Location: Left arm Left arm Left arm   Patient Position: Supine Sitting Standing   Cuff Size: Standard Standard Standard   Pulse: 63 65 71   SpO2: 98%     Weight: 66 2 kg (146 lb)     Height: 5' 3 5\" (1 613 m)         Review of Systems:   Review of Systems   Constitutional: Negative  Negative for activity change, appetite change, chills, diaphoresis, fatigue, fever and unexpected weight change  HENT: Negative  Negative for congestion, dental problem, drooling, ear discharge, ear pain, facial swelling, hearing loss, mouth sores, nosebleeds, postnasal drip, rhinorrhea, sinus pressure, sinus pain, sneezing, sore throat, tinnitus, trouble swallowing and voice change  Eyes: Negative  Negative for photophobia, pain, redness, itching and visual disturbance  Respiratory: Negative  Negative for apnea, cough, choking, chest tightness, shortness of breath, wheezing and stridor  Cardiovascular: Negative  Negative for chest pain, palpitations and leg swelling  Gastrointestinal: Negative  Negative for abdominal distention, abdominal pain, anal bleeding, blood in stool, constipation, diarrhea, nausea, rectal pain and vomiting  Endocrine: Negative  Negative for cold intolerance, heat intolerance, polydipsia, polyphagia and polyuria  Genitourinary: Negative    Negative for decreased urine volume, difficulty urinating, dyspareunia, dysuria, enuresis, flank pain, " "frequency, genital sores, hematuria, menstrual problem, pelvic pain, urgency, vaginal bleeding, vaginal discharge and vaginal pain  Musculoskeletal: Negative  Negative for arthralgias, back pain, gait problem, joint swelling, myalgias, neck pain and neck stiffness  Skin: Negative  Negative for color change, pallor, rash and wound  Allergic/Immunologic: Negative  Negative for environmental allergies, food allergies and immunocompromised state  Neurological: Positive for syncope  Negative for dizziness, tremors, seizures, facial asymmetry, speech difficulty, weakness, light-headedness, numbness and headaches  Hematological: Negative  Negative for adenopathy  Does not bruise/bleed easily  Psychiatric/Behavioral: Negative  Negative for agitation, behavioral problems, confusion, decreased concentration, dysphoric mood, hallucinations, self-injury, sleep disturbance and suicidal ideas  The patient is not nervous/anxious and is not hyperactive  All other systems reviewed and are negative  Vitals:    06/06/23 1534 06/06/23 1542 06/06/23 1545   BP: 108/62 98/68 98/64   BP Location: Left arm Left arm Left arm   Patient Position: Supine Sitting Standing   Cuff Size: Standard Standard Standard   Pulse: 63 65 71   SpO2: 98%     Weight: 66 2 kg (146 lb)     Height: 5' 3 5\" (1 613 m)       Physical Examination:   Physical Exam  Constitutional:       General: She is not in acute distress  Appearance: She is well-developed  She is not diaphoretic  HENT:      Head: Normocephalic and atraumatic  Right Ear: External ear normal       Left Ear: External ear normal    Eyes:      General: No scleral icterus  Right eye: No discharge  Left eye: No discharge  Conjunctiva/sclera: Conjunctivae normal       Pupils: Pupils are equal, round, and reactive to light  Neck:      Thyroid: No thyromegaly  Vascular: No JVD  Trachea: No tracheal deviation     Cardiovascular:      Rate and " "Rhythm: Normal rate and regular rhythm  Heart sounds: No murmur heard  No friction rub  No gallop  Pulmonary:      Effort: Pulmonary effort is normal  No respiratory distress  Breath sounds: Normal breath sounds  No stridor  No wheezing or rales  Chest:      Chest wall: No tenderness  Abdominal:      General: Bowel sounds are normal  There is no distension  Palpations: Abdomen is soft  There is no mass  Tenderness: There is no abdominal tenderness  There is no guarding or rebound  Musculoskeletal:         General: No tenderness or deformity  Normal range of motion  Cervical back: Normal range of motion and neck supple  Skin:     General: Skin is warm and dry  Coloration: Skin is not pale  Findings: No erythema or rash  Neurological:      Mental Status: She is alert and oriented to person, place, and time  Cranial Nerves: No cranial nerve deficit  Motor: No abnormal muscle tone  Coordination: Coordination normal       Deep Tendon Reflexes: Reflexes are normal and symmetric  Reflexes normal    Psychiatric:         Behavior: Behavior normal          Thought Content:  Thought content normal          Judgment: Judgment normal          Labs:     Lab Results   Component Value Date    HCT 37 3 04/25/2023    HGB 12 0 04/25/2023    MCV 85 04/25/2023     04/25/2023    RDW 12 3 04/25/2023    WBC 4 9 04/25/2023     BMP:  Lab Results   Component Value Date    BUN 18 04/25/2023    CALCIUM 9 2 05/15/2020     (H) 04/25/2023    CO2 25 04/25/2023    CREATININE 0 71 04/25/2023    EGFR 104 04/25/2023    GLUC 90 04/25/2023    K 4 2 04/25/2023    MG 1 8 06/28/2019    SODIUM 144 04/25/2023     LFT:  Lab Results   Component Value Date    ALB 4 5 04/25/2023    ALKPHOS 42 (L) 05/15/2020    ALT 9 04/25/2023    AST 18 04/25/2023    TP 6 6 04/25/2023      No results found for: \"[id number]\"  Lab Results   Component Value Date    HGBA1C 5 2 04/25/2023     Lipid " "Profile:   Lab Results   Component Value Date    CHOLESTEROL 236 (H) 04/25/2023    HDL 69 04/25/2023    LDLCALC 154 (H) 04/25/2023    TRIG 77 04/25/2023     Lab Results   Component Value Date    CHOLESTEROL 236 (H) 04/25/2023    CHOLESTEROL 218 (H) 03/15/2021     Lab Results   Component Value Date    CKTOTAL 44 08/06/2017    TROPONINI <0 02 09/17/2019       Imaging & Testing   I have personally reviewed pertinent reports  Cardiac Testing       EKG: Personally reviewed  Normal sinus rhythm no acute ST-T wave changes      Agata Clemens MD [facility]  [phone]  Please call with any questions or suggestions    Counseling :  A description of the counseling:   Goals and Barriers:  Patient's ability to self care:  Medication side effect reviewed with patient in detail and all their questions answered  \"Portions of the record may have been created with voice recognition software  Occasional wrong word or \"sound a like\" substitutions may have occurred due to the inherent limitations of voice recognition software  Read the chart carefully and recognize, using context, where substitutions have occurred  Please call if you have any questions   \"    "

## 2023-06-12 ENCOUNTER — OFFICE VISIT (OUTPATIENT)
Dept: OTOLARYNGOLOGY | Facility: CLINIC | Age: 48
End: 2023-06-12
Payer: COMMERCIAL

## 2023-06-12 VITALS — TEMPERATURE: 97.8 F | HEIGHT: 64 IN | WEIGHT: 146 LBS | BODY MASS INDEX: 24.92 KG/M2

## 2023-06-12 DIAGNOSIS — R43.1 ABNORMAL SMELL: ICD-10-CM

## 2023-06-12 PROCEDURE — 31231 NASAL ENDOSCOPY DX: CPT | Performed by: STUDENT IN AN ORGANIZED HEALTH CARE EDUCATION/TRAINING PROGRAM

## 2023-06-12 PROCEDURE — 99203 OFFICE O/P NEW LOW 30 MIN: CPT | Performed by: STUDENT IN AN ORGANIZED HEALTH CARE EDUCATION/TRAINING PROGRAM

## 2023-06-12 NOTE — PROGRESS NOTES
[facility]  [facility] [address] [facility]  Otolaryngology -- Head and Neck Surgery New Patient Visit  Mayra Chung is a 50 y o  who presents with a chief complaint of     Here for phantoms smells for 1 year  Post COVID19  deniedf nasal blockage, chronic nasal drainage, post nasal drip, facial pressure, poor smell, chronic cough and frequent throat clearing,  sneezing, itchy eyes and nose or nasal bleeding  There is no history of nose or sinus surgeries  Also no history of bronchial asthma  no recent CT scan sinuses  no allergy skin testing          Review of systems: Pertinent review of systems documented in the HPI  10 point ROS documented in a separate note, as necessary  Results reviewed; images from any scan have been personally reviewed: The past medical, surgical, social and family history have been reviewed as documented in today's record    Past Medical History:   Diagnosis Date   • Constipation    • Family history of colonic polyps    • GERD (gastroesophageal reflux disease)    • Headache    • Headache(784 0)    • HL (hearing loss)    • Hyperlipidemia    • Migraine    • Psychiatric disorder    • Suicide and self-inflicted injury [facility])      Past Surgical History:   Procedure Laterality Date   • COLONOSCOPY     • NO PAST SURGERIES       Family History   Problem Relation Age of Onset   • COPD Mother    • Heart disease Father    • Cancer Maternal Grandmother    • Cancer Maternal Grandfather    • Colon cancer Maternal Grandfather    • Cancer Maternal Aunt      Current Outpatient Medications on File Prior to Visit   Medication Sig Dispense Refill   • lamoTRIgine (LaMICtal) 25 MG CHEW daily at bedtime     • LORazepam (ATIVAN) 1 mg tablet Take 1 mg by mouth 2 (two) times a day       • rosuvastatin (CRESTOR) 10 MG tablet Take 1 tablet (10 mg total) by mouth daily at bedtime 90 tablet 1   • SUMAtriptan (IMITREX) 100 mg tablet TAKE 1 TABLET BY MOUTH AT ONSET, MAY "REPEAT IN 2 HOURS IF NEEDED  DO NOT TAKE MORE THAN 2 TABLETS IN 24 HOURS 9 tablet 6   • pantoprazole (PROTONIX) 40 mg tablet TAKE ONE TABLET BY MOUTH EVERY DAY (Patient not taking: Reported on 6/6/2023) 30 tablet 2   • polyethylene glycol (GOLYTELY) 4000 mL solution Take 4,000 mL by mouth once for 1 dose 4000 mL 0   • [DISCONTINUED] bisacodyl (DULCOLAX) 5 mg EC tablet Take 2 tablets (10 mg total) by mouth once for 1 dose 2 tablet 0   • [DISCONTINUED] pantoprazole (PROTONIX) 40 mg tablet Take 1 tablet (40 mg total) by mouth 2 (two) times a day before meals for 14 days (Patient not taking: Reported on 4/7/2023) 28 tablet 0     No current facility-administered medications on file prior to visit  Physical exam:   Temp 97 8 °F (36 6 °C) (Temporal)   Ht 5' 3 5\" (1 613 m)   Wt 66 2 kg (146 lb)   BMI 25 46 kg/m²   Head: Atraumatic, no visible scalp lesions, parotid and submandibular salivary glands non-tender to palpation and without masses bilaterally  Neck:  No visible or palpable cervical lesions or lymphadenopathy, thyroid gland is normal in size and symmetry and without masses, normal laryngeal elevation with swallowing  Ears:    Right ear :  Auricle normal in appearance, mastoid prominence non-tender, external auditory canal clear  Tympanic membranes intact  Left ear :  Auricle normal in appearance, mastoid prominence non-tender, external auditory canal clear   Tympanic membranes intact  Nose/Sinuses:  External appearance unremarkable, no maxillary or frontal sinus tenderness to palpation bilaterally  Nasal endoscopic examination showed deviated nasal septum, bilateral enlarged inferior turbinates, no polyps, thick clear mucus, middle, superior meatus and sphenoethmoid recess clear  Oral Cavity:  Moist mucus membranes, gums and dentition unremarkable, no oral mucosal masses or lesions, floor of mouth soft, tongue mobile without masses or lesions     Oropharynx:  Base of tongue soft and without " masses, tonsils bilaterally unremarkable, soft palate mucosa unremarkable  Eyes:  Extra-ocular movements intact, pupils equally round and reactive to light and accommodation, the lids and conjunctivae are normal in appearance  Constitutional:  Well developed, well nourished and groomed, in no acute distress  Cardiovascular:  Normal rate and rhythm, no palpable thrills, no jugulovenous distension observed  Respiratory:  Normal respiratory effort without evidence of retractions or use of accessory muscles  Neurologic:  Cranial nerves II-XII intact bilaterally  Abdomen: Soft and lax  Extremities: No bruises   Psychiatric:  Alert and oriented to time, place and person  Procedures  Rigid nasal endoscopic examination:  The nasal cavities were decongested with lidocaine and oxymetazoline spray  Bilateral nasal endoscopy was performed as follows:  Endoscopy type: 0 degree rigid scope  Results: look above  The patient tolerated the procedure well  Assessment:   1  Abnormal smell  Ambulatory Referral to Otolaryngology        Orders  No orders of the defined types were placed in this encounter  Discussion/Plan:  She has been experiencing a phantom smell, which I believe is likely to be a result of a previous COVID-19 infection  Nasal endoscopy, which showed no presence of polyps or signs of chronic rhinosinusitis (CRS)   Considering these findings, I recommend smell training as a potential treatment

## 2023-08-15 ENCOUNTER — OFFICE VISIT (OUTPATIENT)
Dept: URGENT CARE | Facility: CLINIC | Age: 48
End: 2023-08-15
Payer: COMMERCIAL

## 2023-08-15 VITALS
SYSTOLIC BLOOD PRESSURE: 108 MMHG | DIASTOLIC BLOOD PRESSURE: 55 MMHG | TEMPERATURE: 98.5 F | RESPIRATION RATE: 16 BRPM | HEART RATE: 67 BPM | OXYGEN SATURATION: 98 %

## 2023-08-15 DIAGNOSIS — R39.15 URINARY URGENCY: Primary | ICD-10-CM

## 2023-08-15 DIAGNOSIS — R30.0 DYSURIA: ICD-10-CM

## 2023-08-15 LAB
SL AMB  POCT GLUCOSE, UA: ABNORMAL
SL AMB LEUKOCYTE ESTERASE,UA: ABNORMAL
SL AMB POCT BILIRUBIN,UA: ABNORMAL
SL AMB POCT BLOOD,UA: ABNORMAL
SL AMB POCT CLARITY,UA: CLEAR
SL AMB POCT COLOR,UA: YELLOW
SL AMB POCT KETONES,UA: ABNORMAL
SL AMB POCT NITRITE,UA: ABNORMAL
SL AMB POCT PH,UA: 5
SL AMB POCT SPECIFIC GRAVITY,UA: 1020
SL AMB POCT URINE PROTEIN: ABNORMAL
SL AMB POCT UROBILINOGEN: 1

## 2023-08-15 PROCEDURE — 87086 URINE CULTURE/COLONY COUNT: CPT | Performed by: NURSE PRACTITIONER

## 2023-08-15 PROCEDURE — 81002 URINALYSIS NONAUTO W/O SCOPE: CPT | Performed by: NURSE PRACTITIONER

## 2023-08-15 PROCEDURE — G0382 LEV 3 HOSP TYPE B ED VISIT: HCPCS | Performed by: NURSE PRACTITIONER

## 2023-08-15 RX ORDER — NITROFURANTOIN 25; 75 MG/1; MG/1
100 CAPSULE ORAL 2 TIMES DAILY
Qty: 10 CAPSULE | Refills: 0 | Status: SHIPPED | OUTPATIENT
Start: 2023-08-15 | End: 2023-08-20

## 2023-08-15 NOTE — PATIENT INSTRUCTIONS
--Symptoms suggestive of possible UTI (bladder infection). Will treat presumptively for now while awaiting urine culture results (anticipate 48-72 hours)-->will call. --Frequent fluids  --Take antibiotic as prescribed  --Seek re-evaluation if no improvement/worsening over the next 24-48 hours with these measures. Follow-up with urologist if urine culture negative with ongoing symptoms.

## 2023-08-15 NOTE — PROGRESS NOTES
[facility]        NAME: Liz Bellamy is a 50 y.o. female  : 1975    MRN: [id number]  DATE: August 15, 2023  TIME: 5:42 PM    Assessment and Plan   Urinary urgency [R39.15]  1. Urinary urgency  Urine culture    nitrofurantoin (MACROBID) 100 mg capsule    Ambulatory referral to Urology      2. Dysuria  POCT urine dip            Patient Instructions     --Symptoms suggestive of possible UTI (bladder infection). Will treat presumptively for now while awaiting urine culture results (anticipate 48-72 hours)-->will call. --Frequent fluids  --Take antibiotic as prescribed  --Seek re-evaluation if no improvement/worsening over the next 24-48 hours with these measures. Follow-up with urologist if urine culture negative with ongoing symptoms. Chief Complaint     Chief Complaint   Patient presents with   • Difficulty Urinating     Frequency , urgency x 1 week oc flomax         History of Present Illness       Here with complaints of urinary frequency/urgency, bladder discomfort x 1 week. No burning per se. No fever/chills, back pain, N/V, spotting, discharge, itching. One prior UTI years ago. Denies history of OAB. Prior urolithiasis, however. Taking Flomax, but doesn't seem to be doing anything. Review of Systems   Review of Systems   Constitutional: Negative for fever. Gastrointestinal: Negative for nausea and vomiting. Genitourinary:        Per HPI   Musculoskeletal: Negative for back pain.          Current Medications       Current Outpatient Medications:   •  lamoTRIgine (LaMICtal) 25 MG CHEW, daily at bedtime, Disp: , Rfl:   •  LORazepam (ATIVAN) 1 mg tablet, Take 1 mg by mouth 2 (two) times a day  , Disp: , Rfl:   •  nitrofurantoin (MACROBID) 100 mg capsule, Take 1 capsule (100 mg total) by mouth 2 (two) times a day for 5 days, Disp: 10 capsule, Rfl: 0  •  pantoprazole (PROTONIX) 40 mg tablet, TAKE ONE TABLET BY MOUTH EVERY DAY, Disp: 30 tablet, Rfl: 2  •  rosuvastatin (CRESTOR) 10 MG tablet, Take 1 tablet (10 mg total) by mouth daily at bedtime, Disp: 90 tablet, Rfl: 1  •  SUMAtriptan (IMITREX) 100 mg tablet, TAKE 1 TABLET BY MOUTH AT ONSET, MAY REPEAT IN 2 HOURS IF NEEDED. DO NOT TAKE MORE THAN 2 TABLETS IN 24 HOURS, Disp: 9 tablet, Rfl: 6  •  polyethylene glycol (GOLYTELY) 4000 mL solution, Take 4,000 mL by mouth once for 1 dose, Disp: 4000 mL, Rfl: 0    Current Allergies     Allergies as of 08/15/2023   • (No Known Allergies)            The following portions of the patient's history were reviewed and updated as appropriate: allergies, current medications, past family history, past medical history, past social history, past surgical history and problem list.     Past Medical History:   Diagnosis Date   • Constipation    • Family history of colonic polyps    • GERD (gastroesophageal reflux disease)    • Headache    • Headache(784.0)    • HL (hearing loss)    • Hyperlipidemia    • Migraine    • Psychiatric disorder    • Suicide and self-inflicted injury [facility])        Past Surgical History:   Procedure Laterality Date   • COLONOSCOPY     • NO PAST SURGERIES         Family History   Problem Relation Age of Onset   • COPD Mother    • Heart disease Father    • Cancer Maternal Grandmother    • Cancer Maternal Grandfather    • Colon cancer Maternal Grandfather    • Cancer Maternal Aunt          Medications have been verified. Objective   /55 (Patient Position: Sitting)   Pulse 67   Temp 98.5 °F (36.9 °C)   Resp 16   SpO2 98%   No LMP recorded. Physical Exam     Physical Exam  Pulmonary:      Effort: Pulmonary effort is normal.   Abdominal:      Tenderness: There is no abdominal tenderness. There is no right CVA tenderness or left CVA tenderness. Neurological:      Mental Status: She is alert.    Psychiatric:         Mood and Affect: Mood normal.

## 2023-08-18 LAB — BACTERIA UR CULT: NORMAL

## 2023-09-02 DIAGNOSIS — K21.9 GASTROESOPHAGEAL REFLUX DISEASE WITHOUT ESOPHAGITIS: ICD-10-CM

## 2023-09-05 RX ORDER — PANTOPRAZOLE SODIUM 40 MG/1
TABLET, DELAYED RELEASE ORAL
Qty: 30 TABLET | Refills: 2 | Status: SHIPPED | OUTPATIENT
Start: 2023-09-05

## 2023-10-23 DIAGNOSIS — E78.2 MIXED HYPERLIPIDEMIA: ICD-10-CM

## 2023-10-23 DIAGNOSIS — R55 SYNCOPE, UNSPECIFIED SYNCOPE TYPE: ICD-10-CM

## 2023-10-23 RX ORDER — ROSUVASTATIN CALCIUM 10 MG/1
10 TABLET, COATED ORAL
Qty: 90 TABLET | Refills: 1 | Status: SHIPPED | OUTPATIENT
Start: 2023-10-23

## 2023-10-31 ENCOUNTER — NURSE TRIAGE (OUTPATIENT)
Dept: OTHER | Facility: OTHER | Age: 48
End: 2023-10-31

## 2023-10-31 NOTE — TELEPHONE ENCOUNTER
Reason for Disposition  • Third attempt to contact caller AND no contact made. Phone number verified.     Protocols used: No Contact or Duplicate Contact Call-ADULT-[place]

## 2023-10-31 NOTE — TELEPHONE ENCOUNTER
Regarding: neck pain, fatigue, nausea  ----- Message from Albertina Adams sent at 10/31/2023 12:13 PM EDT -----  Per escalation guidelines send urgent message to [facility] to be address the same day:     Patient made MyChart appt for 11/1 for: Neck pain on one side for over a week that just aches and then a headache that turns in to a migraine. Also very fatigued and nauseous.

## 2023-11-01 ENCOUNTER — OFFICE VISIT (OUTPATIENT)
Dept: FAMILY MEDICINE CLINIC | Facility: CLINIC | Age: 48
End: 2023-11-01
Payer: COMMERCIAL

## 2023-11-01 VITALS
SYSTOLIC BLOOD PRESSURE: 125 MMHG | BODY MASS INDEX: 24.96 KG/M2 | DIASTOLIC BLOOD PRESSURE: 68 MMHG | RESPIRATION RATE: 16 BRPM | TEMPERATURE: 99.1 F | HEIGHT: 64 IN | WEIGHT: 146.2 LBS | HEART RATE: 85 BPM

## 2023-11-01 DIAGNOSIS — M54.2 NECK PAIN: Primary | ICD-10-CM

## 2023-11-01 DIAGNOSIS — J04.0 LARYNGITIS: ICD-10-CM

## 2023-11-01 PROCEDURE — 99214 OFFICE O/P EST MOD 30 MIN: CPT | Performed by: FAMILY MEDICINE

## 2023-11-01 RX ORDER — IBUPROFEN 600 MG/1
600 TABLET ORAL EVERY 8 HOURS PRN
Qty: 20 TABLET | Refills: 0 | Status: SHIPPED | OUTPATIENT
Start: 2023-11-01

## 2023-11-01 RX ORDER — CYCLOBENZAPRINE HCL 10 MG
10 TABLET ORAL
Qty: 20 TABLET | Refills: 0 | Status: SHIPPED | OUTPATIENT
Start: 2023-11-01 | End: 2023-11-21

## 2023-11-01 RX ORDER — AMOXICILLIN 875 MG/1
875 TABLET, COATED ORAL 2 TIMES DAILY
Qty: 14 TABLET | Refills: 0 | Status: SHIPPED | OUTPATIENT
Start: 2023-11-01 | End: 2023-11-08

## 2023-11-01 NOTE — PROGRESS NOTES
Chief Complaint   Patient presents with   • Neck Pain     Pain has been for over a week the back of her neck she said feels like a ball is in it    • Laryngitis     Migraine, nausea, boday aches started yesterday -covid negative yesterday morning        Patient ID: Igor Guerrero is a 46 y.o. female. Neck Pain   This is a new problem. The current episode started in the past 7 days. The problem occurs constantly. The problem has been unchanged. The pain is associated with an unknown factor. The pain is present in the left side. The quality of the pain is described as aching. The pain is at a severity of 7/10. The pain is moderate. The symptoms are aggravated by position. Associated symptoms include headaches. Pertinent negatives include no chest pain, fever, leg pain, numbness, pain with swallowing, tingling, trouble swallowing, weakness or weight loss. She has tried heat and acetaminophen for the symptoms. The treatment provided no relief. The following portions of the patient's history were reviewed and updated as appropriate: allergies, current medications, past family history, past medical history, past social history, past surgical history and problem list.    Review of Systems   Constitutional:  Positive for activity change, appetite change and fatigue. Negative for chills, fever and weight loss. HENT:  Positive for voice change (x 2 days). Negative for congestion, ear pain, rhinorrhea, sinus pain and trouble swallowing. Respiratory:  Negative for cough. Cardiovascular:  Negative for chest pain. Gastrointestinal:  Positive for nausea. Negative for abdominal pain, constipation and vomiting. Musculoskeletal:  Positive for neck pain and neck stiffness. Neurological:  Positive for headaches. Negative for tingling, weakness and numbness.        Current Outpatient Medications   Medication Sig Dispense Refill   • lamoTRIgine (LaMICtal) 25 MG CHEW daily at bedtime     • LORazepam (ATIVAN) 1 mg tablet Take 1 mg by mouth 2 (two) times a day       • pantoprazole (PROTONIX) 40 mg tablet TAKE ONE TABLET BY MOUTH EVERY DAY 30 tablet 2   • rosuvastatin (CRESTOR) 10 MG tablet Take 1 tablet (10 mg total) by mouth daily at bedtime 90 tablet 1   • SUMAtriptan (IMITREX) 100 mg tablet TAKE 1 TABLET BY MOUTH AT ONSET, MAY REPEAT IN 2 HOURS IF NEEDED. DO NOT TAKE MORE THAN 2 TABLETS IN 24 HOURS 9 tablet 6     No current facility-administered medications for this visit. Objective:    /68 (BP Location: Left arm, Patient Position: Sitting, Cuff Size: Large)   Pulse 85   Temp 99.1 °F (37.3 °C)   Resp 16   Ht 5' 3.5" (1.613 m)   Wt 66.3 kg (146 lb 3.2 oz)   LMP 09/04/2023   BMI 25.49 kg/m²        Physical Exam  Constitutional:       General: She is not in acute distress. Appearance: She is not ill-appearing. HENT:      Nose: No congestion or rhinorrhea. Mouth/Throat:      Pharynx: No oropharyngeal exudate or posterior oropharyngeal erythema. Cardiovascular:      Rate and Rhythm: Normal rate. Pulmonary:      Effort: Pulmonary effort is normal. No respiratory distress. Musculoskeletal:      Cervical back: Spasms and tenderness (L side) present. No rigidity or bony tenderness. Normal range of motion. Neurological:      Mental Status: She is alert and oriented to person, place, and time. Psychiatric:         Mood and Affect: Mood normal.                 Assessment/Plan:         Diagnoses and all orders for this visit:    Neck pain  -     cyclobenzaprine (FLEXERIL) 10 mg tablet; Take 1 tablet (10 mg total) by mouth daily at bedtime for 20 days  -     ibuprofen (MOTRIN) 600 mg tablet; Take 1 tablet (600 mg total) by mouth every 8 (eight) hours as needed for moderate pain    Laryngitis  -     amoxicillin (AMOXIL) 875 mg tablet;  Take 1 tablet (875 mg total) by mouth 2 (two) times a day for 7 days            Rto prn               Zamzam Hopkins MD

## 2023-11-01 NOTE — LETTER
November 1, 2023     Patient: Cuate Ray  YOB: 1975  Date of Visit: 11/1/2023      To Whom it May Concern:    Becky Harrison is under my professional care. Lan Bermudez was seen in my office on 11/1/2023. Lan Bermudez may return to work on 11/6/23 . If you have any questions or concerns, please don't hesitate to call.          Sincerely,          Bridger Acevedo MD        CC: No Recipients

## 2023-11-06 DIAGNOSIS — G43.109 MIGRAINE WITH AURA AND WITHOUT STATUS MIGRAINOSUS, NOT INTRACTABLE: ICD-10-CM

## 2023-11-06 RX ORDER — SUMATRIPTAN 100 MG/1
TABLET, FILM COATED ORAL
Qty: 9 TABLET | Refills: 6 | Status: SHIPPED | OUTPATIENT
Start: 2023-11-06

## 2023-11-21 LAB
CHOLEST SERPL-MCNC: 150 MG/DL (ref 100–199)
HDLC SERPL-MCNC: 71 MG/DL
LDLC SERPL CALC-MCNC: 68 MG/DL (ref 0–99)
LDLC/HDLC SERPL: 1 RATIO (ref 0–3.2)
SL AMB VLDL CHOLESTEROL CALC: 11 MG/DL (ref 5–40)
TRIGL SERPL-MCNC: 50 MG/DL (ref 0–149)

## 2023-11-28 NOTE — PROGRESS NOTES
Progress Note - Cardiology Office  [facility]    Sung Barajas 46 y.o. female MRN: [id number]  : 1975  Encounter: [id number]      Assessment:     Syncope. Mixed hyperlipidemia. GERD. Migraines. Discussion Summary and Plan:    Syncope. -  Patient was last seen in outpatient cardiology office on 23 as a consultation for periodic syncopal episodes. Thought to be possibly vasovagal, possibly neurological in setting of history of migraines. Prior orthostatic vital signs negative. - Since last office visit patient denies experiencing any further episodes of syncope, lightheadedness, dizziness, nausea or vomiting.   - She continues to use compression stockings. Mixed hyperlipidemia. - 23 lipid panel: Cholesterol 150, triglycerides 50, HDL 71, LDL 60.  - Lipid panel has improved since prior lipid panels. - Currently on Crestor 10 mg daily. GERD.    - Currently on pantoprazole 40 mg daily.   - Has been evaluated by [facility], last seen on 3/14/2023. Migraines. - Has been evaluated by [facility] in the past, last seen 2020.  - Currently on sumatriptan 100 mg daily. Patient / Doreen Resendez was advised and educated to call our office  immediately if  patient has any new symptoms of chest pain/shortness of breath, near-syncope, syncope, light headedness sustained palpitations  or any other cardiovascular symptoms before their scheduled follow-up appointment. Office number was provided #[phone]. Please call [phone] if any questions. Counseling :  A description of the counseling. Goals and Barriers. Patient's ability to self care: Yes  Medication side effect reviewed with patient in detail and all their questions answered to their satisfaction. HPI :     Sung Barajas is a 46 y.o. female with PMHx of syncope, mixed HLD, GERD, and migraines, who presents for routine outpatient cardiology follow-up.     Patient was last seen in outpatient cardiology office on 6/06/23 as a consultation for periodic syncopal episodes. Thought to be possibly vasovagal, possibly neurological in setting of history of migraines. Prior orthostatic vital signs negative. Patient denies experiencing any further episodes of syncope since June 2023. She does state that she had an episode of palpitations several weeks ago while lying in bed. She states that her watch told her her heart rate was in the 120s. She reports that her palpitations lasted approximately 20 minutes and then resolved. She states that she has had no further episodes of palpitations since then. She states that she feels that her baseline self. She denies recently experiencing chest pain, palpitations, shortness of breath, lightheadedness, dizziness, headache, nausea, vomiting, or syncope. Review of Systems   Constitutional:  Negative for activity change, appetite change, chills, diaphoresis, fatigue, fever and unexpected weight change. Respiratory:  Negative for cough, chest tightness, shortness of breath and wheezing. Cardiovascular:  Negative for chest pain, palpitations and leg swelling. Gastrointestinal:  Negative for abdominal distention, abdominal pain, constipation, diarrhea, nausea and vomiting. Skin: Negative. Neurological:  Negative for dizziness, syncope, weakness, light-headedness, numbness and headaches.        Historical Information   Past Medical History:   Diagnosis Date    Constipation     Family history of colonic polyps     GERD (gastroesophageal reflux disease)     Headache     Headache(784.0)     HL (hearing loss)     Hyperlipidemia     Migraine     Psychiatric disorder     Suicide and self-inflicted injury [facility])      Past Surgical History:   Procedure Laterality Date    COLONOSCOPY      NO PAST SURGERIES       Social History     Substance and Sexual Activity   Alcohol Use Never    Comment: rare     Social History     Substance and Sexual Activity   Drug Use No     Social History     Tobacco Use   Smoking Status Never   Smokeless Tobacco Never     Family History:   Family History   Problem Relation Age of Onset    COPD Mother     Heart disease Father     Cancer Maternal Grandmother     Cancer Maternal Grandfather     Colon cancer Maternal Grandfather     Cancer Maternal Aunt        Meds/Allergies     No Known Allergies    Current Outpatient Medications:     ibuprofen (MOTRIN) 600 mg tablet, Take 1 tablet (600 mg total) by mouth every 8 (eight) hours as needed for moderate pain, Disp: 20 tablet, Rfl: 0    lamoTRIgine (LaMICtal) 25 MG CHEW, daily at bedtime, Disp: , Rfl:     LORazepam (ATIVAN) 1 mg tablet, Take 1 mg by mouth 2 (two) times a day  , Disp: , Rfl:     pantoprazole (PROTONIX) 40 mg tablet, TAKE ONE TABLET BY MOUTH EVERY DAY, Disp: 30 tablet, Rfl: 2    rosuvastatin (CRESTOR) 10 MG tablet, Take 1 tablet (10 mg total) by mouth daily at bedtime, Disp: 90 tablet, Rfl: 1    SUMAtriptan (IMITREX) 100 mg tablet, TAKE ONE TABLET BY MOUTH AT ONSET, MAY REPEAT IN 2 HOURS IF NEEDED. DO NOT TAKE MORE THAN 2 TABLETS IN 24 HOURS, Disp: 9 tablet, Rfl: 6    Vitals: Blood pressure 100/60, pulse 71, height 5' 3.5" (1.613 m), weight 67.1 kg (148 lb), last menstrual period 09/04/2023, SpO2 99 %, not currently breastfeeding. Body mass index is 25.81 kg/m². Wt Readings from Last 3 Encounters:   12/01/23 67.1 kg (148 lb)   11/01/23 66.3 kg (146 lb 3.2 oz)   06/12/23 66.2 kg (146 lb)     Vitals:    12/01/23 1455   Weight: 67.1 kg (148 lb)     BP Readings from Last 3 Encounters:   12/01/23 100/60   11/01/23 125/68   08/15/23 108/55       Physical Exam:  Physical Exam  Vitals reviewed. Constitutional:       General: She is not in acute distress. Cardiovascular:      Rate and Rhythm: Normal rate and regular rhythm. Pulses: Normal pulses. Heart sounds: Murmur heard. Pulmonary:      Effort: Pulmonary effort is normal. No respiratory distress. Breath sounds: Normal breath sounds. Abdominal:      General: Abdomen is flat. There is no distension. Palpations: Abdomen is soft. Tenderness: There is no abdominal tenderness. Musculoskeletal:      Right lower leg: No edema. Left lower leg: No edema. Skin:     General: Skin is warm and dry. Neurological:      Mental Status: She is alert and oriented to person, place, and time. Diagnostic Studies Review Cardio:      EK/01/23 EKG: normal sinus rhythm, 76 bpm. Unchanged from prior EKGs. Cardiac testing:   Stress test only, exercise     Name: Larry Madsen  MR #: [id number]  Account #: [de-identified]  Study date: 2019  : 1975  Age: 37 years  Gender: Female  Height: 64 in  Weight: 131 lb  BSA: 1.64 m squared     Allergies: NO KNOWN ALLERGIES     Primary Physician:  JAGDEEP Cameron  Referring Physician:  Lisa Goodman MD  RN:  Trevor Underwood RN  Technician:  Gladis Garrett  Interpreting Physician:  Lisa Goodman MD     HISTORY: The patient is a 37year old  female. Chest pain status: chest pain. Other symptoms: palpitations. Coronary artery disease risk factors: none. Cardiovascular history: none significant. Medications: no cardiac drugs. PHYSICAL EXAM: Baseline physical exam screening: normal.     REST ECG: Normal sinus rhythm. PROCEDURE: The heart rate was 90, at the start of the study. Systolic blood pressure was 118 mmHg, at the start of the study. Diastolic blood pressure was 76 mmHg, at the start of the study. [place] PROTOCOL:  HR bpm SBP mmHg DBP mmHg Symptoms ST change Rhythm/conduct  Baseline 90 118 76 none none NSR, no ectopy  Stage 1 102 122 76 none none NSR, no ectopy  Stage 2 136 138 76 none none NSR, no ectopy  Stage 3 157 146 78 mild dyspnea none --  Stage 4 169 -- -- mild dyspnea none --     STRESS SUMMARY: Duration of exercise was 11 min and 59 sec.  Maximal heart rate during stress was 171 bpm ( 97 % of maximal predicted heart rate). The rate-pressure product for the peak heart rate and blood pressure was 37117. There was no  chest pain during stress. The stress test was terminated due to achievement of target heart rate. The stress ECG was negative for ischemia. There were no stress arrhythmias or conduction abnormalities. SUMMARY:  -  Stress results: Duration of exercise was 11 min and 59 sec. Maximal heart rate during stress was 171 bpm ( 97 % of maximal predicted heart rate). Target heart rate was achieved. There was no chest pain during stress. -  ECG conclusions: The stress ECG was negative for ischemia.  -  Impressions and recommendations: Normal study after maximal exercise without reproduction of symptoms. IMPRESSIONS: Normal study after maximal exercise without reproduction of symptoms. Prepared and signed by     Isabella Reddy MD  Signed 04/03/2019 10:01:34           Imaging:  Chest X-Ray:   No Chest XR results available for this patient. CT-scan of the chest:     No CTA results available for this patient.   Lab Review   Lab Results   Component Value Date    WBC 4.9 04/25/2023    HGB 12.0 04/25/2023    HCT 37.3 04/25/2023    MCV 85 04/25/2023    RDW 12.3 04/25/2023     04/25/2023     BMP:  Lab Results   Component Value Date    SODIUM 144 04/25/2023    K 4.2 04/25/2023     (H) 04/25/2023    CO2 25 04/25/2023    BUN 18 04/25/2023    CREATININE 0.71 04/25/2023    GLUC 90 04/25/2023    CALCIUM 9.2 05/15/2020    EGFR 104 04/25/2023    MG 1.8 06/28/2019     Troponins:    LFT:  Lab Results   Component Value Date    AST 18 04/25/2023    ALT 9 04/25/2023    ALKPHOS 42 (L) 05/15/2020    TP 6.6 04/25/2023    ALB 4.5 04/25/2023      No components found for: "TSH3"  No results found for: "[id number]"  Lab Results   Component Value Date    HGBA1C 5.2 04/25/2023     Lipid Profile:   Lab Results   Component Value Date    CHOLESTEROL 150 11/20/2023    HDL 71 11/20/2023    LDLCALC 68 11/20/2023    TRIG 50 11/20/2023     Lab Results   Component Value Date    CHOLESTEROL 150 11/20/2023    CHOLESTEROL 236 (H) 04/25/2023     Lab Results   Component Value Date    CKTOTAL 44 08/06/2017    TROPONINI <0.02 09/17/2019     No results found for: "NTBNP"   Recent Results (from the past 672 hour(s))   Lipid Panel with Direct LDL reflex    Collection Time: 11/20/23  6:46 AM   Result Value Ref Range    Cholesterol, Total 150 100 - 199 mg/dL    Triglycerides 50 0 - 149 mg/dL    HDL 71 >39 mg/dL    VLDL Cholesterol Calculated 11 5 - 40 mg/dL    LDL Calculated 68 0 - 99 mg/dL    LDl/HDL Ratio 1.0 0.0 - 3.2 ratio             Dana Mendez PA-C

## 2023-11-29 ENCOUNTER — TELEPHONE (OUTPATIENT)
Dept: NEUROLOGY | Facility: CLINIC | Age: 48
End: 2023-11-29

## 2023-11-29 NOTE — TELEPHONE ENCOUNTER
Anitha Alarcon to sche a New Patient appt - no referral so created a self referral. Patient was triaged, sent over to provider, advised of process, referral status was updated, insurance was verified.

## 2023-11-29 NOTE — TELEPHONE ENCOUNTER
JANETH Mendez [facility]  DEE    [id number]    [facility] #      [id number]

## 2023-12-01 ENCOUNTER — OFFICE VISIT (OUTPATIENT)
Dept: CARDIOLOGY CLINIC | Facility: CLINIC | Age: 48
End: 2023-12-01
Payer: COMMERCIAL

## 2023-12-01 VITALS
DIASTOLIC BLOOD PRESSURE: 60 MMHG | OXYGEN SATURATION: 99 % | SYSTOLIC BLOOD PRESSURE: 100 MMHG | BODY MASS INDEX: 25.27 KG/M2 | HEART RATE: 65 BPM | HEIGHT: 64 IN | WEIGHT: 148 LBS

## 2023-12-01 DIAGNOSIS — R55 SYNCOPE, UNSPECIFIED SYNCOPE TYPE: ICD-10-CM

## 2023-12-01 DIAGNOSIS — E78.2 MIXED HYPERLIPIDEMIA: Primary | ICD-10-CM

## 2023-12-01 PROCEDURE — 99214 OFFICE O/P EST MOD 30 MIN: CPT | Performed by: PHYSICIAN ASSISTANT

## 2023-12-01 PROCEDURE — 93000 ELECTROCARDIOGRAM COMPLETE: CPT | Performed by: PHYSICIAN ASSISTANT

## 2023-12-01 RX ORDER — ROSUVASTATIN CALCIUM 10 MG/1
10 TABLET, COATED ORAL
Qty: 90 TABLET | Refills: 3 | Status: SHIPPED | OUTPATIENT
Start: 2023-12-01 | End: 2024-11-25

## 2023-12-05 DIAGNOSIS — K21.9 GASTROESOPHAGEAL REFLUX DISEASE WITHOUT ESOPHAGITIS: ICD-10-CM

## 2023-12-05 RX ORDER — PANTOPRAZOLE SODIUM 40 MG/1
TABLET, DELAYED RELEASE ORAL
Qty: 30 TABLET | Refills: 2 | Status: SHIPPED | OUTPATIENT
Start: 2023-12-05

## 2023-12-20 ENCOUNTER — TELEPHONE (OUTPATIENT)
Dept: NEUROLOGY | Facility: CLINIC | Age: 48
End: 2023-12-20

## 2023-12-27 ENCOUNTER — CONSULT (OUTPATIENT)
Dept: NEUROLOGY | Facility: CLINIC | Age: 48
End: 2023-12-27
Payer: COMMERCIAL

## 2023-12-27 VITALS
HEIGHT: 63 IN | HEART RATE: 65 BPM | SYSTOLIC BLOOD PRESSURE: 121 MMHG | DIASTOLIC BLOOD PRESSURE: 72 MMHG | TEMPERATURE: 98 F | BODY MASS INDEX: 26.82 KG/M2 | WEIGHT: 151.4 LBS

## 2023-12-27 DIAGNOSIS — R68.89 SPELLS OF DECREASED ATTENTIVENESS: ICD-10-CM

## 2023-12-27 DIAGNOSIS — G44.40 MEDICATION OVERUSE HEADACHE: ICD-10-CM

## 2023-12-27 DIAGNOSIS — R55 SYNCOPE, UNSPECIFIED SYNCOPE TYPE: ICD-10-CM

## 2023-12-27 DIAGNOSIS — G43.009 MIGRAINE WITHOUT AURA AND WITHOUT STATUS MIGRAINOSUS, NOT INTRACTABLE: Primary | ICD-10-CM

## 2023-12-27 DIAGNOSIS — M54.81 OCCIPITAL NEURALGIA OF LEFT SIDE: ICD-10-CM

## 2023-12-27 PROCEDURE — 99205 OFFICE O/P NEW HI 60 MIN: CPT | Performed by: PSYCHIATRY & NEUROLOGY

## 2023-12-27 RX ORDER — RIZATRIPTAN BENZOATE 10 MG/1
10 TABLET ORAL AS NEEDED
Qty: 18 TABLET | Refills: 3 | Status: SHIPPED | OUTPATIENT
Start: 2023-12-27

## 2023-12-27 RX ORDER — NORTRIPTYLINE HYDROCHLORIDE 10 MG/1
CAPSULE ORAL
Qty: 90 CAPSULE | Refills: 3 | Status: SHIPPED | OUTPATIENT
Start: 2023-12-27

## 2023-12-27 RX ORDER — PREDNISONE 10 MG/1
TABLET ORAL
Qty: 42 TABLET | Refills: 0 | Status: SHIPPED | OUTPATIENT
Start: 2023-12-27

## 2023-12-27 NOTE — PROGRESS NOTES
NEUROLOGY OUTPATIENT - NEW PATIENT VISIT NOTE        NAME: Melissa Harrington  MRN: [id number]  : 1975     TODAY'S DATE: 23         HISTORY OF PRESENT ILLNESS (HPI):    Ms. Harrington is a 48 y.o. female presenting with Headache       HEADACHE DESCRIPTION:    Onset of headaches: several years back, but they got worse in the last year or so.  Location of headaches: left-sided unilateral and occipital  Radiation: Yes   Quality of the pain: sharp and shooting  Intensity of the headache: At present: 6/10;  At its worst:  10/10  Duration of the headaches:hour(s)  Frequency of the headaches:daily  Presence of aura:  Yes, some ache in the back of the head  Associated symptoms with headaches: no vomiting , +nausea, +light sensitivity , and +sound sensitivity--She is also reporting some numbness in the left V1, tearing which lasts for couple of hours and then it gets better. Some visual complaints with her headaches. She is also having some memory complaints and has some problem with recall.   Triggers:No   Positional component: Yes, laying down makes them worse.    Alleviating factors: N/A   Any specific time of the day when get the headaches: no particular time of day    Family history of migraine headaches: Yes, mother had migraine headaches  History of neck and head trauma: No  Smoking status: No  Oral contraceptive use: N/A    Life style factors:  -Hydration: adequate  -Sleep: inadequate 4-5 hours  -Caffeine intake: 1 cups of ice tea at dinner  -Alcohol intake: none     Impact of headches:  -Number of headaches in past 30 days: 30/30 days.   -Number of days missed per month because of headache: No in the last 30 days.   -Number of ER visits for her headaches: No ER visit  in the last 30 days.       Treatment:  -Over the counter medications tried for headaches:   Tylenol been ineffective, Ibuprofen been ineffective, Naproxen been ineffective, and Excedrin migraine been ineffective --she has been taking it daily  for the last many months?    -Prescription medications:  Abortive or Rescue Medications used:   Sumatriptan been helpful --She has been using the Imitrex about 9 tabs every month     Preventative or daily medications used for headaches:   Topamax been helpful but stopped it due to side effects         Red Flags for headache:  Age <5 or >50: Yes  First worst headaches: No  Maximal at intensity: No  Change in pattern: Yes  New headache in pregnancy, cancer or immunocompromised patient: No  Loss of consciousness or convulsions: No  Headache triggered by exertion, Valsalva maneuver or sexual activity: No  Abnormal exam: please see below in Neurological examination.    [id number], summer 2022. She would know when she is going to pass out, black curtain in front of her eyes, no shaking episodes, loss of consciousness present, some confusion after wards, No tongue biting, no loss of bowel and bladder control. No hypoglycemic episodes reported.     OUTSIDE RECORDS:    historical medical records   Patient, 45 years of age and right-handed, with additional diagnoses of dyslipidemia, bipolar depression, and past nephrolithiasis, presents for further evaluation treatment regarding ongoing headache issues.  She was accompanied by her fianceMechelle.  Patient relates the onset of her headaches to mid to late teen years.  Headaches have been for the most part stereotyped.  Generally right hemicranial although occasionally left hemicranial onset.  Described as pulsatile.  Associated with light and sound sensitivity, nausea and occasional vomiting.  Relates that occasionally the headaches are preceded by a visual aura.  In the recent past her headaches have escalated in frequency to the point where she was experiencing prior to the initiation of propranolol by the PCP 3-4 headaches on a weekly basis.  Per protocol has been initiated.  Most recently at a dose of 80 mg daily.  On the propranolol she has had a significant reduction in  "frequency to now perhaps 3-4 migraines per month.  However, her main issue is with significant headache issues during her menses.  On questioning she does have a tendency to fluid retention during her menses.  Her headaches do respond to sumatriptan.  Tolerating her current propranolol schedule with no reported adverse side effects.  Does report a family history of migraine (mother).  CURRENT OUTPATIENT MEDICATIONS:    Melissa Harrington has a current medication list which includes the following prescription(s): lamotrigine, lorazepam, pantoprazole, rosuvastatin, sumatriptan, and ibuprofen.       PHYSICAL EXAMINATION:   VITAL SIGNS:  height is 5' 3\" (1.6 m) and weight is 68.7 kg (151 lb 6.4 oz). Her temporal temperature is 98 °F (36.7 °C). Her blood pressure is 121/72 and her pulse is 65.        NEUROLOGICAL EXAMINATION:    MENTAL STATUS EXAM: Alert, oriented to time place and person     CRANIAL NERVE EXAMINATION:  I Not tested   II Normal visual fields to finger counting.   II, Ill,  IV, VI Pupils were symmetric, briskly reactive. No afferent pupillary defect.  Eye movements are full without nystagmus. No ptosis.   V Facial sensation were intact   VII Facial movements were symmetrical    VIII Hearing was intact   IX, X Intact   XI Shoulder shrug and head turn was normal   XII Tongue protrusion is in the mid line.          MOTOR EXAM:        Arm Right  Left Leg Right  Left   Deltoid 5/5 5/5 lliopsoas 5/5 5/5   Biceps 5/5 5/5 Quads 5/5 5/5   Triceps 5/5 5/5 Hamstrings 5/5 5/5   Wrist Extension 5/5 4+/5 Ankle Dorsi Flexion 5/5 5/5   Wrist Flexion 5/5 4+/5 Ankle Plantar Flexion 5/5 5/5               DEEP TENDON REFLEXES:     Brachioradialis Biceps Triceps Patellar Achilles   Right 2+ 2+ 2+ 3+ 2+   Left 2+ 2+ 2+ 3+ 2+            SENSORY EXAMINATION:   Sensation to dull touch decreased in the RLE     COORDINATION:   normal finger to nose testing     GAIT/STATION:   normal        DIAGNOSTIC STUDIES:   PERTINENT LABS:     Lab " "Results   Component Value Date    WBC 4.9 04/25/2023     Lab Results   Component Value Date    HGB 12.0 04/25/2023     Lab Results   Component Value Date     04/25/2023     Lab Results   Component Value Date    LYMPHSABS 2.3 09/27/2021     No results found for: \"LABLYMP\"  Lab Results   Component Value Date    EOSABS 0.2 09/27/2021     No components found for: \"NEUTROPHILS\"    No results found for: \"LABMONO\"         No results found for: \"LABGLUC\"  Lab Results   Component Value Date    CREATININE 0.71 04/25/2023     Lab Results   Component Value Date    AST 18 04/25/2023     Lab Results   Component Value Date    ALT 9 04/25/2023     Lab Results   Component Value Date    ALKPHOS 42 (L) 05/15/2020     Lab Results   Component Value Date    AST 18 04/25/2023        No results found for: \"FOLATE\"       Lab Results   Component Value Date    TSH 1.510 04/25/2023            NEUROIMAGING:      CT BRAIN - WITHOUT CONTRAST     INDICATION:   head injury LOC.     COMPARISON:  August 20, 2018     TECHNIQUE:  CT examination of the brain was performed.  In addition to axial images, coronal 2D reformatted images were created and submitted for interpretation.       Radiation dose length product (DLP) for this visit:  947.96 mGy-cm .  This examination, like all CT scans performed in the [facility], was performed utilizing techniques to minimize radiation dose exposure, including the use of iterative   reconstruction and automated exposure control.       IMAGE QUALITY:  Diagnostic.     FINDINGS:     PARENCHYMA:  No intracranial mass, mass effect or midline shift. No CT signs of acute infarction.  No acute parenchymal hemorrhage.  Stable lacunar infarcts in the left basal ganglion. Stable coarse calcifications in the basal ganglia more pronounced on   the left than right     VENTRICLES AND EXTRA-AXIAL SPACES:  Normal for the patient's age.     VISUALIZED ORBITS AND PARANASAL SINUSES:  Unremarkable.     CALVARIUM " AND EXTRACRANIAL SOFT TISSUES:  Normal.        IMPRESSION:  No acute intracranial abnormality.               ASSESSMENT AND PLAN:    Ms. Harrington is a 48 y.o.female  presenting with headaches. Patient  has a past medical history of Constipation, Family history of colonic polyps, GERD (gastroesophageal reflux disease), Headache, Headache(784.0), HL (hearing loss), Hyperlipidemia, Migraine, Psychiatric disorder, and Suicide and self-inflicted injury (HCC).. Neurological exam is concerning for mild weakness in the distal muscles of the left hand. No recent neuroimaging. Likely differential at this time include migraine headaches, TAC with tearing and numbness of the face but they are never associated with the headaches, Occipital neurologia is also on the differential as well.         Migraine without aura and without status migrainosus, not intractable    Prednisone taper to break the headache cycle: Take 6 tabs for 2 days, 5 tabs for 2 days, 4 tabs for 2 days, 3 tabs for 2 days, 2 tabs for 2 days, 1 tabs for 2 days and then stop      Prophylactic medication: You have to take it daily to help prevent the headaches. Start with Pamelor 10 mg at bed time. . Side effects were discussed in detail.     Rescue medications. Start taking Maxalt 10 mg   on as needed basis as a rescue medicine for your migraine headaches. Please take the medicine on the first sign of your migraine headaches.  You can repeat the dose after 2 hours but no more than 3 tabs in 24 hours.Side effects were discussed in detail. Stop Imitrex.     We can also consider JAVNO block on the left side, but she would let us know.       Healthy lifestyle modifications were also discussed with the patient and written instructions were given.     Magnesium 400 mg daily along with riboflavin 400 mg can also help with headache control          Spells of decreased attentiveness/Syncope, unspecified syncope type  Unsure if these episode are from syncope vs seizures.   -  EEG Routine and awake; Future           Return in about 3 months (around 3/27/2024) for Lan .       The management plan was discussed in detail with the patient and all questions were answered.     Ms. Harrington was encouraged to contact our office with any questions or concerns and to contact the clinic or go to the nearest emergency room if symptoms change or worsen.       I have spent a total of 62 min in reviewing and/or ordering tests, medications, or procedures, performing an examination or evaluation, reviewing pertinent history, counseling and educating the patient, referring and/or communicating with other health care professionals, documenting in the EMR and general coordination of care of Ms. Harrington  today.           Misyt Guevara MD.   Staff Neurologist,   Neuroimmunology and Neuroinfectious disease  12/27/23     This report has been created through the use of voice recognition/text compilation software.  Typographical and content errors may occur with this process.  While efforts are made to detect and correct such errors, in some cases errors will persist.  For this reason, wording in this document should be considered in the proper context and not strictly verbatim.  If, when reviewing the document, an error is discovered, please let the office know at [phone]

## 2023-12-27 NOTE — PATIENT INSTRUCTIONS
Lifestyle adjustments. Irrespective of treatment modalities applied, trigger control and lifestyle modification are indispensable to the successful management of migraine. This is especially important in children, adolescents, or pregnant women where drug treatments must be especially limited.    Although there is no robust evidence for most of the recommendations, most are general health measures that, given the lack of adverse effects and the benefit for general well-being, we consider should be recommended in all patients.    Avoid triggers. Many patients attribute the onset or worsening of pain to specific triggers such as stress, sleep changes, food, or atmospheric changes, among others. It is even possible that the relationship occurs inversely, so that premonitory symptoms such as sleep disturbances and appetite 48 to 72 hours before the onset of pain can be misinterpreted by the patient as the trigger of migraine attacks. The therapeutic implications of this relationship are also unclear. There are possible triggers such as sleep deprivation, fasting, or certain foods that can be easily avoidable. But avoiding other triggers can lead to very restrictive lifestyles with a reduction in quality of life that does not outweigh the potential beneficial.    Sleep. Another complex relationship is headache and sleep. An excess or lack of sleep can trigger migraine attacks and at the same time rest is one of the most used treatments to improve the symptoms of the migraine attack. Additionally, migraine and other headaches occur comorbidly with sleep disorders. Patients with chronic migraine have a higher prevalence of sleep disorders, specifically poor sleep habits and nonrestorative rest.    Regarding general sleep measures for patients with headache, it is recommended to define regular sleep schedules that allow 8 hours of rest per day, insisting that they remain constant also during the weekend; have dinner 4 hours  before bedtime and avoid liquids in the last two hours; and eliminate naps and avoid using screens, television, reading, or listening to music in bed. A nonpharmacological intervention to improve sleep habits can improve headache frequency and even reverse chronic to episodic migraine.    Diet. In the scientific literature, but especially in the informative websites and magazines, multiple and varied diets are proposed that aim to reduce the frequency of headaches. There are two main approaches: elimination diets, which consist of suppressing potentially triggering foods such as chocolate, alcohol, cheese, nuts, or citrus fruits and diets that provide high or low amounts of certain components, ie, rich in vitamin B12, B6, or D or low in histamine, lactose, or fatty acids. The studies are not very rigorous and most do not have a control group (). In addition, it must be considered that food triggers were only associated with onset of headache in less than 10% of the participants.    Dietary recommendations for patients with migraine should be the same as for the general population with special emphasis on the prevention of obesity, which is a factor related to headache chronification. It is recommendable to have a varied diet, eating five meals a day to avoid periods of prolonged fasting and incorporating water intake to reach around 2.5 liters per day, which should be increased in case of physical activity or increase in temperature or humidity. Specific diets should be recommended solely based on whether there are other comorbidities in the patient.    Caffeine at moderate doses (< 400mg/day: equivalent to two cups of coffee) does not seem to have a negative effect on headache frequency although it should be taken regularly to avoid withdrawal headaches.    Although patients with migraine headaches and cluster headaches may be more susceptible to alcohol as a precipitant, there is no evidence to recommend  abstinence from alcohol in all patients. Individual predisposition and cultural factors must be considered.    Exercise. Aerobic exercise can prevent or reduce symptoms of multiple chronic diseases, including headache. With some methodological limitations, there are studies that demonstrate benefits of aerobic exercise as a therapeutic intervention to reduce the frequency and intensity of headaches, as well as the quality of life measured by questionnaires. Exercise can have a beneficial effect on headaches directly but also indirectly, improving sleep quality, mood, cardiovascular function, and preventing weight gain. In addition, it can improve the control of other diseases frequently comorbid with headache such as obesity, hypertension, anxiety, depression, or sleep disorders (). The clinical benefit of yoga as an add-on therapy in patients with episodic migraine has been demonstrated.       Vitamins for headache  Mg 400 mg daily for headache  RiboFlavin 400 mg daily for headache

## 2023-12-27 NOTE — PROGRESS NOTES
Review of Systems   Constitutional:  Negative for appetite change, fatigue and fever.   HENT: Negative.  Negative for hearing loss, tinnitus, trouble swallowing and voice change.    Eyes: Negative.  Negative for photophobia, pain and visual disturbance.   Respiratory: Negative.  Negative for shortness of breath.    Cardiovascular: Negative.  Negative for palpitations.   Gastrointestinal: Negative.  Negative for nausea and vomiting.   Endocrine: Negative.  Negative for cold intolerance.   Genitourinary: Negative.  Negative for dysuria, frequency and urgency.   Musculoskeletal:  Negative for back pain, gait problem, myalgias, neck pain and neck stiffness.   Skin: Negative.  Negative for rash.   Allergic/Immunologic: Negative.    Neurological:  Positive for headaches. Negative for dizziness, tremors, seizures, syncope, facial asymmetry, speech difficulty, weakness, light-headedness and numbness.   Hematological: Negative.  Does not bruise/bleed easily.   Psychiatric/Behavioral: Negative.  Negative for confusion, hallucinations and sleep disturbance.

## 2024-01-24 ENCOUNTER — HOSPITAL ENCOUNTER (OUTPATIENT)
Dept: NEUROLOGY | Facility: HOSPITAL | Age: 49
Discharge: HOME | End: 2024-01-24
Payer: COMMERCIAL

## 2024-01-24 ENCOUNTER — HOSPITAL ENCOUNTER (OUTPATIENT)
Dept: RADIOLOGY | Facility: HOSPITAL | Age: 49
Discharge: HOME | End: 2024-01-24
Payer: COMMERCIAL

## 2024-01-24 DIAGNOSIS — R55 SYNCOPE, UNSPECIFIED SYNCOPE TYPE: ICD-10-CM

## 2024-01-24 DIAGNOSIS — G43.009 MIGRAINE WITHOUT AURA AND WITHOUT STATUS MIGRAINOSUS, NOT INTRACTABLE: ICD-10-CM

## 2024-01-24 PROCEDURE — 95816 EEG AWAKE AND DROWSY: CPT

## 2024-01-24 PROCEDURE — 70553 MRI BRAIN STEM W/O & W/DYE: CPT

## 2024-01-24 PROCEDURE — G1004 CDSM NDSC: HCPCS

## 2024-01-24 PROCEDURE — A9585 GADOBUTROL INJECTION: HCPCS | Performed by: PSYCHIATRY & NEUROLOGY

## 2024-01-24 RX ORDER — GADOBUTROL 604.72 MG/ML
6 INJECTION INTRAVENOUS
Status: COMPLETED | OUTPATIENT
Start: 2024-01-24 | End: 2024-01-24

## 2024-01-24 RX ADMIN — GADOBUTROL 6 ML: 604.72 INJECTION INTRAVENOUS at 16:42

## 2024-01-25 PROCEDURE — 95812 EEG 41-60 MINUTES: CPT | Performed by: PSYCHIATRY & NEUROLOGY

## 2024-01-29 ENCOUNTER — PATIENT MESSAGE (OUTPATIENT)
Dept: NEUROLOGY | Facility: CLINIC | Age: 49
End: 2024-01-29

## 2024-01-29 DIAGNOSIS — G43.009 MIGRAINE WITHOUT AURA AND WITHOUT STATUS MIGRAINOSUS, NOT INTRACTABLE: Primary | ICD-10-CM

## 2024-01-29 RX ORDER — PREDNISONE 10 MG/1
TABLET ORAL
Qty: 42 TABLET | Refills: 0 | Status: SHIPPED | OUTPATIENT
Start: 2024-01-29

## 2024-02-21 PROBLEM — Z12.39 BREAST CANCER SCREENING: Status: RESOLVED | Noted: 2018-11-12 | Resolved: 2024-02-21

## 2024-03-03 ENCOUNTER — TELEPHONE (OUTPATIENT)
Dept: NEUROLOGY | Facility: CLINIC | Age: 49
End: 2024-03-03

## 2024-03-03 NOTE — TELEPHONE ENCOUNTER
Called patient left voicemail re appointment with  on 3/28/24, let pt know new location in [place]. Left call back number and address.

## 2024-03-07 DIAGNOSIS — G43.009 MIGRAINE WITHOUT AURA AND WITHOUT STATUS MIGRAINOSUS, NOT INTRACTABLE: Primary | ICD-10-CM

## 2024-03-07 RX ORDER — ATOGEPANT 30 MG/1
30 TABLET ORAL
Qty: 90 TABLET | Refills: 3 | Status: SHIPPED | OUTPATIENT
Start: 2024-03-07

## 2024-03-07 NOTE — PROGRESS NOTES
Maxalt and Divya are not helping with her headaches so I have started her on Qulipta 30 mg once a day    1. Migraine without aura and without status migrainosus, not intractable    - Atogepant (Qulipta) 30 MG TABS; Take 30 mg by mouth daily in the early morning  Dispense: 90 tablet; Refill: 3        Jay Silver Guevara MD.   Staff Neurologist  03/07/24   1:51 PM

## 2024-03-28 ENCOUNTER — OFFICE VISIT (OUTPATIENT)
Age: 49
End: 2024-03-28
Payer: COMMERCIAL

## 2024-03-28 VITALS
BODY MASS INDEX: 26.93 KG/M2 | SYSTOLIC BLOOD PRESSURE: 112 MMHG | DIASTOLIC BLOOD PRESSURE: 71 MMHG | OXYGEN SATURATION: 99 % | HEART RATE: 80 BPM | HEIGHT: 63 IN | WEIGHT: 152 LBS

## 2024-03-28 DIAGNOSIS — G43.701 CHRONIC MIGRAINE WITHOUT AURA, WITH STATUS MIGRAINOSUS: ICD-10-CM

## 2024-03-28 DIAGNOSIS — G43.009 MIGRAINE WITHOUT AURA AND WITHOUT STATUS MIGRAINOSUS, NOT INTRACTABLE: Primary | ICD-10-CM

## 2024-03-28 PROCEDURE — 96372 THER/PROPH/DIAG INJ SC/IM: CPT | Performed by: PSYCHIATRY & NEUROLOGY

## 2024-03-28 PROCEDURE — 99215 OFFICE O/P EST HI 40 MIN: CPT | Performed by: PSYCHIATRY & NEUROLOGY

## 2024-03-28 RX ORDER — SUMATRIPTAN 100 MG/1
100 TABLET, FILM COATED ORAL AS NEEDED
Qty: 9 TABLET | Refills: 3 | Status: SHIPPED | OUTPATIENT
Start: 2024-03-28

## 2024-03-28 RX ORDER — PROPRANOLOL HYDROCHLORIDE 40 MG/1
TABLET ORAL
Qty: 90 TABLET | Refills: 3 | Status: SHIPPED | OUTPATIENT
Start: 2024-03-28

## 2024-03-28 RX ORDER — KETOROLAC TROMETHAMINE 30 MG/ML
30 INJECTION, SOLUTION INTRAMUSCULAR; INTRAVENOUS ONCE
Status: COMPLETED | OUTPATIENT
Start: 2024-03-28 | End: 2024-03-28

## 2024-03-28 RX ORDER — DEXAMETHASONE SODIUM PHOSPHATE 4 MG/ML
4 INJECTION, SOLUTION INTRA-ARTICULAR; INTRALESIONAL; INTRAMUSCULAR; INTRAVENOUS; SOFT TISSUE ONCE
Status: COMPLETED | OUTPATIENT
Start: 2024-03-28 | End: 2024-03-28

## 2024-03-28 RX ADMIN — KETOROLAC TROMETHAMINE 30 MG: 30 INJECTION, SOLUTION INTRAMUSCULAR; INTRAVENOUS at 15:22

## 2024-03-28 RX ADMIN — DEXAMETHASONE SODIUM PHOSPHATE 4 MG: 4 INJECTION, SOLUTION INTRA-ARTICULAR; INTRALESIONAL; INTRAMUSCULAR; INTRAVENOUS; SOFT TISSUE at 15:21

## 2024-03-28 NOTE — PROGRESS NOTES
NEUROLOGY OUTPATIENT - FOLLOW UP PATIENT VISIT NOTE        NAME: Melissa Harrington  MRN: [id number]  : 1975     TODAY'S DATE: 24         HISTORY OF PRESENT ILLNESS (HPI):    Ms. Harrington is a 48 y.o. female presenting with Migraine        INTERIM HISTORY:   Since the last clinic visit patient reports that her Qulipta was not approved and she has been suffering from daily headaches  Frequency of headaches days : Daily headaches--the frequency seems to be getting worse  Intensity of the headaches days: The intensity seems to be getting worse  Medicine for headaches: Qulipta 30 mg (not approved by the insurance yet)  Side effects from the medications.  Failed medications: Topamax, Imitrex Maxalt and Pamelor.   Any imaging including CTH or MRI of the brain: no acute findings. Some calcifications and a nasopharyngeal cyst.   Feels a little bit down today secondary to getting the daily headaches as it is affecting her quality of life.  EEG which was ordered during the last clinic visit came back within normal limits      PRIOR NOTES:       HEADACHE DESCRIPTION:    Onset of headaches: several years back, but they got worse in the last year or so.  Location of headaches: left-sided unilateral and occipital  Radiation: Yes   Quality of the pain: sharp and shooting  Intensity of the headache: At present: 6/10;  At its worst:  10/10  Duration of the headaches:hour(s)  Frequency of the headaches:daily  Presence of aura:  Yes, some ache in the back of the head  Associated symptoms with headaches: no vomiting , +nausea, +light sensitivity , and +sound sensitivity--She is also reporting some numbness in the left V1, tearing which lasts for couple of hours and then it gets better. Some visual complaints with her headaches. She is also having some memory complaints and has some problem with recall.   Triggers:No   Positional component: Yes, laying down makes them worse.    Alleviating factors: N/A   Any specific time of  the day when get the headaches: no particular time of day    Family history of migraine headaches: Yes, mother had migraine headaches  History of neck and head trauma: No  Smoking status: No  Oral contraceptive use: N/A    Life style factors:  -Hydration: adequate  -Sleep: inadequate 4-5 hours  -Caffeine intake: 1 cups of ice tea at dinner  -Alcohol intake: none     Impact of headches:  -Number of headaches in past 30 days: 30/30 days.   -Number of days missed per month because of headache: No in the last 30 days.   -Number of ER visits for her headaches: No ER visit  in the last 30 days.       Treatment:  -Over the counter medications tried for headaches:   Tylenol been ineffective, Ibuprofen been ineffective, Naproxen been ineffective, and Excedrin migraine been ineffective --she has been taking it daily for the last many months?    -Prescription medications:  Abortive or Rescue Medications used:   Sumatriptan been helpful --She has been using the Imitrex about 9 tabs every month     Preventative or daily medications used for headaches:   Topamax been helpful but stopped it due to side effects         Red Flags for headache:  Age <5 or >50: Yes  First worst headaches: No  Maximal at intensity: No  Change in pattern: Yes  New headache in pregnancy, cancer or immunocompromised patient: No  Loss of consciousness or convulsions: No  Headache triggered by exertion, Valsalva maneuver or sexual activity: No  Abnormal exam: please see below in Neurological examination.    5094-1425, summer 2022. She would know when she is going to pass out, black curtain in front of her eyes, no shaking episodes, loss of consciousness present, some confusion after wards, No tongue biting, no loss of bowel and bladder control. No hypoglycemic episodes reported.     OUTSIDE RECORDS:    historical medical records   Patient, 45 years of age and right-handed, with additional diagnoses of dyslipidemia, bipolar depression, and past  "nephrolithiasis, presents for further evaluation treatment regarding ongoing headache issues.  She was accompanied by her fianceMechelle.  Patient relates the onset of her headaches to mid to late teen years.  Headaches have been for the most part stereotyped.  Generally right hemicranial although occasionally left hemicranial onset.  Described as pulsatile.  Associated with light and sound sensitivity, nausea and occasional vomiting.  Relates that occasionally the headaches are preceded by a visual aura.  In the recent past her headaches have escalated in frequency to the point where she was experiencing prior to the initiation of propranolol by the PCP 3-4 headaches on a weekly basis.  Per protocol has been initiated.  Most recently at a dose of 80 mg daily.  On the propranolol she has had a significant reduction in frequency to now perhaps 3-4 migraines per month.  However, her main issue is with significant headache issues during her menses.  On questioning she does have a tendency to fluid retention during her menses.  Her headaches do respond to sumatriptan.  Tolerating her current propranolol schedule with no reported adverse side effects.  Does report a family history of migraine (mother).  CURRENT OUTPATIENT MEDICATIONS:    Melissa Harrington has a current medication list which includes the following prescription(s): lamotrigine, lorazepam, pantoprazole, propranolol, rosuvastatin, sumatriptan, qulipta, ibuprofen, prednisone, and prednisone, and the following Facility-Administered Medications: dexamethasone and ketorolac.       PHYSICAL EXAMINATION:   VITAL SIGNS:  height is 5' 3\" (1.6 m) and weight is 68.9 kg (152 lb). Her blood pressure is 112/71 and her pulse is 80. Her oxygen saturation is 99%.        NEUROLOGICAL EXAMINATION:    MENTAL STATUS EXAM: Alert, oriented to time place and person     CRANIAL NERVE EXAMINATION:  I Not tested   II Normal visual fields to finger counting.   II, Ill,  IV, VI Pupils were " "symmetric, briskly reactive. No afferent pupillary defect.  Eye movements are full without nystagmus. No ptosis.   V Facial sensation were intact   VII Facial movements were symmetrical    VIII Hearing was intact   IX, X Intact   XI Shoulder shrug and head turn was normal   XII Tongue protrusion is in the mid line.          MOTOR EXAM:        Arm Right  Left Leg Right  Left   Deltoid 5/5 5/5 lliopsoas 5/5 5/5   Biceps 5/5 5/5 Quads 5/5 5/5   Triceps 5/5 5/5 Hamstrings 5/5 5/5   Wrist Extension 5/5 5/5 Ankle Dorsi Flexion 5/5 5/5   Wrist Flexion 5/5 5/5 Ankle Plantar Flexion 5/5 5/5               DEEP TENDON REFLEXES:     Brachioradialis Biceps Triceps Patellar Achilles   Right 2+ 2+ 2+ 3+ 2+   Left 2+ 2+ 2+ 3+ 2+            SENSORY EXAMINATION:   Sensation intact in the BUE     COORDINATION:   normal finger to nose testing     GAIT/STATION:   normal        DIAGNOSTIC STUDIES:   PERTINENT LABS:     Lab Results   Component Value Date    WBC 4.9 04/25/2023     Lab Results   Component Value Date    HGB 12.0 04/25/2023     Lab Results   Component Value Date     04/25/2023     Lab Results   Component Value Date    LYMPHSABS 2.3 09/27/2021     No results found for: \"LABLYMP\"  Lab Results   Component Value Date    EOSABS 0.2 09/27/2021     No components found for: \"NEUTROPHILS\"    No results found for: \"LABMONO\"         No results found for: \"LABGLUC\"  Lab Results   Component Value Date    CREATININE 0.71 04/25/2023     Lab Results   Component Value Date    AST 18 04/25/2023     Lab Results   Component Value Date    ALT 9 04/25/2023     Lab Results   Component Value Date    ALKPHOS 42 (L) 05/15/2020     Lab Results   Component Value Date    AST 18 04/25/2023        No results found for: \"FOLATE\"       Lab Results   Component Value Date    TSH 1.510 04/25/2023            NEUROIMAGING:  Results for orders placed during the hospital encounter of 01/24/24    MRI brain with and without contrast    Impression  No mass " effect, acute intracranial hemorrhage or evidence of recent infarction. No abnormal parenchymal or leptomeningeal enhancement identified.    T1 shortening within the left thalamus is consistent with paradoxical calcification as correlated with prior CT. Findings are stable since 2011.    Prominent left Virchow-Jakub space is also noted and stable since 2011.    Workstation performed: [id number]      CT BRAIN - WITHOUT CONTRAST     INDICATION:   head injury LOC.     COMPARISON:  August 20, 2018     TECHNIQUE:  CT examination of the brain was performed.  In addition to axial images, coronal 2D reformatted images were created and submitted for interpretation.       Radiation dose length product (DLP) for this visit:  947.96 mGy-cm .  This examination, like all CT scans performed in the [facility], was performed utilizing techniques to minimize radiation dose exposure, including the use of iterative   reconstruction and automated exposure control.       IMAGE QUALITY:  Diagnostic.     FINDINGS:     PARENCHYMA:  No intracranial mass, mass effect or midline shift. No CT signs of acute infarction.  No acute parenchymal hemorrhage.  Stable lacunar infarcts in the left basal ganglion. Stable coarse calcifications in the basal ganglia more pronounced on   the left than right     VENTRICLES AND EXTRA-AXIAL SPACES:  Normal for the patient's age.     VISUALIZED ORBITS AND PARANASAL SINUSES:  Unremarkable.     CALVARIUM AND EXTRACRANIAL SOFT TISSUES:  Normal.        IMPRESSION:  No acute intracranial abnormality.     EEG Interpretation:  This 41-60 minute EEG  recorded during wakefulness, drowsiness, and sleep is normal.  However, normal EEG doesn't rule out seizure disorder. Clinical correlation is recommended.                 ASSESSMENT AND PLAN:    Ms. Harrington is a 48 y.o.female  presenting with headaches follow-up. Patient  has a past medical history of Constipation, Family history of colonic polyps, GERD  (gastroesophageal reflux disease), Headache, Headache(784.0), HL (hearing loss), Hyperlipidemia, Migraine, Psychiatric disorder, and Suicide and self-inflicted injury (HCC)..  She did underwent MRI of the brain which was showing some left thalamic calcification which has been stable since 2011 along with nasopharyngeal cyst.  She has already failed a number of different medications and the insurance has not approved Qulipta and since the last visit.  She feels that the migraine headaches are affecting her quality of life.  She was initially a little bit reluctant about the Botox but today she would like to try the Botox for her chronic migraines.  I would also recommend starting her on propranolol to see if that helps with her headaches and him in the meantime we will try to get approval for the Botox     The primary encounter diagnosis was Migraine without aura and without status migrainosus, not intractable. A diagnosis of Chronic migraine without aura, with status migrainosus was also pertinent to this visit.    Start propranolol 40 mg at bedtime which can be increased to 80 mg as a preventative agent  Start with Imitrex 100 mg on as-needed basis for headache    Botox approval:     Tried and failed at least:    3 Preventative agents: must document length of time and reason for discontinuation: anti depressants, Beta blockers, ACE, Ca channel, AED: Topamax,  and Pamelor. Started her on Propranolol     2 abortive agent: Imitrex Maxalt    Mention of any trial with anti CRGP inhibitors.  Qulipta was denied by the insurance    More than 15 headache days per month (each headache lasting for more than 4 hours) yes    Diagnosis covered for Botox:     G43.701      For the worsening headaches, I am giving her Toradol 30 mg along with dexamethasone 4 mg to break the headache cycle.        Return in about 3 months (around 6/28/2024).       The management plan was discussed in detail with the patient and all questions were  answered.     Ms. Harrington was encouraged to contact our office with any questions or concerns and to contact the clinic or go to the nearest emergency room if symptoms change or worsen.       I have spent a total of 42 min in reviewing and/or ordering tests, medications, or procedures, performing an examination or evaluation, reviewing pertinent history, counseling and educating the patient, referring and/or communicating with other health care professionals, documenting in the EMR and general coordination of care of Ms. Harrington  today.           Misty Guevara MD.   Staff Neurologist,   Neuroimmunology and Neuroinfectious disease  03/28/24     This report has been created through the use of voice recognition/text compilation software.  Typographical and content errors may occur with this process.  While efforts are made to detect and correct such errors, in some cases errors will persist.  For this reason, wording in this document should be considered in the proper context and not strictly verbatim.  If, when reviewing the document, an error is discovered, please let the office know at [phone]

## 2024-03-28 NOTE — PATIENT INSTRUCTIONS
Ajovy is an injection, which is given once a month under you skin: AJOVY may cause allergic reactions, including itching, rash, and hives that can happen within hours and up to 1 month after receiving AJOVY. Call your healthcare provider or get emergency medical help right away if you have any symptoms of an allergic reaction: swelling of your face, mouth, tongue, throat, or if you have trouble breathing. Talk to your doctor about stopping AJOVY if you have an allergic reaction.    Aimovig is an injection, which is given once a month under you skin. The common side effects include Allergic reactions, including rash or swelling can happen after receiving Aimovig®. This can happen within hours to days after using Aimovig®. Call your HCP or get emergency medical help right away if you have any of the following symptoms of an allergic reaction: swelling of the face, mouth, tongue or throat, or trouble breathing. Constipation with serious complications. Severe constipation can happen after receiving Aimovig®. In some cases people have been hospitalized or needed surgery. Contact your HCP if you have severe constipation or constipation associated with symptoms such as severe or constant belly pain, vomiting, swelling of belly or bloating.High blood pressure. High blood pressure or worsening of high blood pressure can happen after receiving Aimovig®. Contact your healthcare provider if you have an increase in blood pressure.    Emgality is an injection, which is given once a month under you skin. The initial does is 2 injections and then it is one injection a month there after. Common side effects include allergic reactions, such as itching, rash, hives, and trouble breathing. Allergic reactions can happen days after using Emgality. Call your healthcare provider or get emergency medical help right away if you have any of the following symptoms, which may be part of an allergic reaction: swelling of your face, mouth,  tongue, or throat, or trouble breathing. Common side effects--The most common side effects of Emgality are injection site reactions.      Vera Sethi, RN  MS Nurse Navigator  [facility]  [address] [place] [address]  P:[phone] F:[phone]  [email or web address]       Yamila Burkett--[phone]   Natacha Delgadillo       Team 3

## 2024-03-29 ENCOUNTER — TELEPHONE (OUTPATIENT)
Age: 49
End: 2024-03-29

## 2024-03-29 NOTE — TELEPHONE ENCOUNTER
Faxed completed insurance prior authorization form to [employer] for Quilipta approval. Fax confirmation in media.

## 2024-04-12 ENCOUNTER — TELEPHONE (OUTPATIENT)
Dept: NEUROLOGY | Facility: CLINIC | Age: 49
End: 2024-04-12

## 2024-04-12 NOTE — TELEPHONE ENCOUNTER
----- Message from Jayritu Guevara MD sent at 3/28/2024  3:24 PM EDT -----  Regarding: Request for Botox approval  Botox approval:     Tried and failed at least:    3 Preventative agents: must document length of time and reason for discontinuation: anti depressants, Beta blockers, ACE, Ca channel, AED: Topamax,  and Pamelor. Started her on Propranolol     2 abortive agent: Imitrex Maxalt    Mention of any trial with anti CRGP inhibitors.  Qulipta was denied by the insurance    More than 15 headache days per month (each headache lasting for more than 4 hours) yes    Diagnosis covered for Botox:     G43.701    Thank you,     Dr. Ismael MD.   03/28/24   3:25 PM

## 2024-04-12 NOTE — TELEPHONE ENCOUNTER
Lance Vargas,    I just wanted to make sure that you received this new start from Dr. Guevara.  Thanks!

## 2024-04-25 ENCOUNTER — TELEPHONE (OUTPATIENT)
Dept: NEUROLOGY | Facility: CLINIC | Age: 49
End: 2024-04-25

## 2024-04-25 NOTE — TELEPHONE ENCOUNTER
----- Message -----  From: Misty Guevara MD  Subject: Request for Botox approval                       Botox approval:     Tried and failed at least:    3 Preventative agents: must document length of time and reason for discontinuation: anti depressants, Beta blockers, ACE, Ca channel, AED: Topamax,  and Pamelor. Started her on Propranolol     2 abortive agent: Imitrex Maxalt    Mention of any trial with anti CRGP inhibitors.  Qulipta was denied by the insurance    More than 15 headache days per month (each headache lasting for more than 4 hours) yes    Diagnosis covered for Botox:     G43.701    Thank you,     Dr. Ismael MD.   03/28/24   3:25 PM

## 2024-05-01 ENCOUNTER — OFFICE VISIT (OUTPATIENT)
Dept: URGENT CARE | Facility: CLINIC | Age: 49
End: 2024-05-01
Payer: COMMERCIAL

## 2024-05-01 VITALS
OXYGEN SATURATION: 96 % | DIASTOLIC BLOOD PRESSURE: 70 MMHG | SYSTOLIC BLOOD PRESSURE: 113 MMHG | RESPIRATION RATE: 18 BRPM | HEIGHT: 63 IN | BODY MASS INDEX: 26.93 KG/M2 | HEART RATE: 74 BPM | TEMPERATURE: 98.9 F | WEIGHT: 152 LBS

## 2024-05-01 DIAGNOSIS — J06.9 UPPER RESPIRATORY TRACT INFECTION, UNSPECIFIED TYPE: Primary | ICD-10-CM

## 2024-05-01 PROCEDURE — G0382 LEV 3 HOSP TYPE B ED VISIT: HCPCS | Performed by: NURSE PRACTITIONER

## 2024-05-01 RX ORDER — BENZONATATE 200 MG/1
200 CAPSULE ORAL 3 TIMES DAILY PRN
Qty: 20 CAPSULE | Refills: 0 | Status: SHIPPED | OUTPATIENT
Start: 2024-05-01

## 2024-05-01 RX ORDER — PREDNISONE 20 MG/1
20 TABLET ORAL 2 TIMES DAILY WITH MEALS
Qty: 10 TABLET | Refills: 0 | Status: SHIPPED | OUTPATIENT
Start: 2024-05-01 | End: 2024-05-06

## 2024-05-01 NOTE — PROGRESS NOTES
[facility]        NAME: Melissa Harrington is a 48 y.o. female  : 1975    MRN: [id number]  DATE: May 1, 2024  TIME: 10:58 AM    Assessment and Plan   Upper respiratory tract infection, unspecified type [J06.9]  1. Upper respiratory tract infection, unspecified type  benzonatate (TESSALON) 200 MG capsule    predniSONE 20 mg tablet            Patient Instructions     Take med as prescribed   Follow up with PCP in 3-5 days.  Proceed to  ER if symptoms worsen.    If tests have been performed at [facility], our office will contact you with results if changes need to be made to the care plan discussed with you at the visit.  You can review your full results on [facility].    Chief Complaint     Chief Complaint   Patient presents with    Wheezing     Pt reports that she has been having congestion and severe wheezing. She did use her nebulizer/duoneb- at 330 and 730. She feels very tight in her chest and has a dry non productive cough.          History of Present Illness       Shortness of Breath  The current episode started yesterday. The problem occurs intermittently. The problem has been waxing and waning since onset. Associated symptoms include chest pain, orthopnea, rhinorrhea, a sore throat and wheezing. Pertinent negatives include no leg swelling. The symptoms are aggravated by exercise, URIs, pollens, weather changes and lying flat.     Reports chest congestion, sneezing, sore throat. Ongoing x 5 days. SOB and wheezing started last night    Review of Systems   Review of Systems   Constitutional:  Negative for fever.   HENT:  Positive for ear pain, rhinorrhea, sneezing and sore throat.    Respiratory:  Positive for shortness of breath and wheezing.    Cardiovascular:  Positive for chest pain and orthopnea. Negative for leg swelling.   Gastrointestinal:  Positive for abdominal pain. Negative for vomiting.   Musculoskeletal:  Negative for neck pain.   Skin:  Negative for rash.   Neurological:   Negative for headaches.         Current Medications       Current Outpatient Medications:     Atogepant (Qulipta) 30 MG TABS, Take 30 mg by mouth daily in the early morning, Disp: 90 tablet, Rfl: 3    benzonatate (TESSALON) 200 MG capsule, Take 1 capsule (200 mg total) by mouth 3 (three) times a day as needed for cough, Disp: 20 capsule, Rfl: 0    lamoTRIgine (LaMICtal) 25 MG CHEW, daily at bedtime, Disp: , Rfl:     LORazepam (ATIVAN) 1 mg tablet, Take 1 mg by mouth 2 (two) times a day  , Disp: , Rfl:     pantoprazole (PROTONIX) 40 mg tablet, TAKE ONE TABLET BY MOUTH EVERY DAY, Disp: 30 tablet, Rfl: 2    predniSONE 20 mg tablet, Take 1 tablet (20 mg total) by mouth 2 (two) times a day with meals for 5 days, Disp: 10 tablet, Rfl: 0    propranolol (INDERAL) 40 mg tablet, Start with 40 mg at bed time and then increase the dose to 80 mg in a week's time, if no relief., Disp: 90 tablet, Rfl: 3    rosuvastatin (CRESTOR) 10 MG tablet, Take 1 tablet (10 mg total) by mouth daily at bedtime, Disp: 90 tablet, Rfl: 3    SUMAtriptan (Imitrex) 100 mg tablet, Take 1 tablet (100 mg total) by mouth if needed for migraine (Take one tab at the start of the headache, if no relief can repeat the dose in 2 hours but not more than 3 doses in 24 hours.) for up to 1 dose, Disp: 9 tablet, Rfl: 3    ibuprofen (MOTRIN) 600 mg tablet, Take 1 tablet (600 mg total) by mouth every 8 (eight) hours as needed for moderate pain (Patient not taking: Reported on 12/27/2023), Disp: 20 tablet, Rfl: 0    predniSONE 10 mg tablet, Prednisone taper: Take 6 tabs for 2 days, 5 tabs for 2 days, 4 tabs for 2 days, 3 tabs for 2 days, 2 tabs for 2 days, 1 tabs for 2 days and then stop (Patient not taking: Reported on 5/1/2024), Disp: 42 tablet, Rfl: 0    predniSONE 10 mg tablet, Prednisone taper: Take 6 tabs for 2 days, 5 tabs for 2 days, 4 tabs for 2 days, 3 tabs for 2 days, 2 tabs for 2 days, 1 tabs for 2 days and then stop (Patient not taking: Reported on  "3/28/2024), Disp: 42 tablet, Rfl: 0    Current Allergies     Allergies as of 05/01/2024    (No Known Allergies)            The following portions of the patient's history were reviewed and updated as appropriate: allergies, current medications, past family history, past medical history, past social history, past surgical history and problem list.     Past Medical History:   Diagnosis Date    Constipation     Family history of colonic polyps     GERD (gastroesophageal reflux disease)     Headache     Headache(784.0)     HL (hearing loss)     Hyperlipidemia     Migraine     Psychiatric disorder     Suicide and self-inflicted injury (HCC)        Past Surgical History:   Procedure Laterality Date    COLONOSCOPY      NO PAST SURGERIES         Family History   Problem Relation Age of Onset    COPD Mother     Heart disease Father     Cancer Maternal Grandmother     Cancer Maternal Grandfather     Colon cancer Maternal Grandfather     Cancer Maternal Aunt          Medications have been verified.        Objective   /70   Pulse 74   Temp 98.9 °F (37.2 °C)   Resp 18   Ht 5' 3\" (1.6 m)   Wt 68.9 kg (152 lb)   SpO2 96%   BMI 26.93 kg/m²   No LMP recorded.       Physical Exam     Physical Exam  Constitutional:       General: She is not in acute distress.  HENT:      Right Ear: Tympanic membrane normal.      Left Ear: Tympanic membrane normal.      Nose: Rhinorrhea present.      Mouth/Throat:      Pharynx: No posterior oropharyngeal erythema.   Cardiovascular:      Rate and Rhythm: Regular rhythm.      Heart sounds: Normal heart sounds.   Pulmonary:      Effort: Pulmonary effort is normal.      Breath sounds: No wheezing (some congestion in the lungs).                   "

## 2024-05-25 DIAGNOSIS — E78.2 MIXED HYPERLIPIDEMIA: ICD-10-CM

## 2024-05-25 DIAGNOSIS — R55 SYNCOPE, UNSPECIFIED SYNCOPE TYPE: ICD-10-CM

## 2024-05-25 RX ORDER — ROSUVASTATIN CALCIUM 10 MG/1
10 TABLET, COATED ORAL
Qty: 90 TABLET | Refills: 1 | Status: SHIPPED | OUTPATIENT
Start: 2024-05-25

## 2024-06-28 ENCOUNTER — TELEPHONE (OUTPATIENT)
Age: 49
End: 2024-06-28

## 2024-07-09 ENCOUNTER — OFFICE VISIT (OUTPATIENT)
Age: 49
End: 2024-07-09
Payer: COMMERCIAL

## 2024-07-09 VITALS
OXYGEN SATURATION: 98 % | DIASTOLIC BLOOD PRESSURE: 60 MMHG | WEIGHT: 149 LBS | HEIGHT: 63 IN | SYSTOLIC BLOOD PRESSURE: 98 MMHG | HEART RATE: 76 BPM | BODY MASS INDEX: 26.4 KG/M2

## 2024-07-09 DIAGNOSIS — G43.701 CHRONIC MIGRAINE WITHOUT AURA, WITH STATUS MIGRAINOSUS: Primary | ICD-10-CM

## 2024-07-09 PROCEDURE — 99215 OFFICE O/P EST HI 40 MIN: CPT | Performed by: PSYCHIATRY & NEUROLOGY

## 2024-07-09 NOTE — PROGRESS NOTES
NEUROLOGY OUTPATIENT - FOLLOW UP PATIENT VISIT NOTE        NAME: Melissa Harrington  MRN: [id number]  : 1975     TODAY'S DATE: 24         HISTORY OF PRESENT ILLNESS (HPI):    Ms. Harrington is a 48 y.o. female presenting with Migraine        INTERIM HISTORY:   Frequency of headaches days : 3-4 headaches per month which is a huge improvement from last time when she was getting daily headaches  Intensity of the headaches days: less intense    Medicine for headaches: Qulipta 30 mg  and imitex     Side effects from the medications.  Denies having any side effects from the medication    Failed medications: Topamax, Imitrex Maxalt and Pamelor    Any imaging including CTH or MRI of the brain: no acute findings. Some calcifications and a nasopharyngeal cyst.      Still awaiting approval for Botox from her insurance.     PRIOR NOTES:  Since the last clinic visit patient reports that her Qulipta was not approved and she has been suffering from daily headaches  Frequency of headaches days : Daily headaches--the frequency seems to be getting worse  Intensity of the headaches days: The intensity seems to be getting worse  Medicine for headaches: Qulipta 30 mg (not approved by the insurance yet)  Side effects from the medications.  Failed medications: Topamax, Imitrex Maxalt and Pamelor.   Any imaging including CTH or MRI of the brain: no acute findings. Some calcifications and a nasopharyngeal cyst.   Feels a little bit down today secondary to getting the daily headaches as it is affecting her quality of life.  EEG which was ordered during the last clinic visit came back within normal limits       HEADACHE DESCRIPTION:    Onset of headaches: several years back, but they got worse in the last year or so.  Location of headaches: left-sided unilateral and occipital  Radiation: Yes   Quality of the pain: sharp and shooting  Intensity of the headache: At present: 6/10;  At its worst:  10/10  Duration of the  headaches:hour(s)  Frequency of the headaches:daily  Presence of aura:  Yes, some ache in the back of the head  Associated symptoms with headaches: no vomiting , +nausea, +light sensitivity , and +sound sensitivity--She is also reporting some numbness in the left V1, tearing which lasts for couple of hours and then it gets better. Some visual complaints with her headaches. She is also having some memory complaints and has some problem with recall.   Triggers:No   Positional component: Yes, laying down makes them worse.    Alleviating factors: N/A   Any specific time of the day when get the headaches: no particular time of day    Family history of migraine headaches: Yes, mother had migraine headaches  History of neck and head trauma: No  Smoking status: No  Oral contraceptive use: N/A    Life style factors:  -Hydration: adequate  -Sleep: inadequate 4-5 hours  -Caffeine intake: 1 cups of ice tea at dinner  -Alcohol intake: none     Impact of headches:  -Number of headaches in past 30 days: 30/30 days.   -Number of days missed per month because of headache: No in the last 30 days.   -Number of ER visits for her headaches: No ER visit  in the last 30 days.       Treatment:  -Over the counter medications tried for headaches:   Tylenol been ineffective, Ibuprofen been ineffective, Naproxen been ineffective, and Excedrin migraine been ineffective --she has been taking it daily for the last many months?    -Prescription medications:  Abortive or Rescue Medications used:   Sumatriptan been helpful --She has been using the Imitrex about 9 tabs every month     Preventative or daily medications used for headaches:   Topamax been helpful but stopped it due to side effects         Red Flags for headache:  Age <5 or >50: Yes  First worst headaches: No  Maximal at intensity: No  Change in pattern: Yes  New headache in pregnancy, cancer or immunocompromised patient: No  Loss of consciousness or convulsions: No  Headache triggered  by exertion, Valsalva maneuver or sexual activity: No  Abnormal exam: please see below in Neurological examination.    [id number], summer 2022. She would know when she is going to pass out, black curtain in front of her eyes, no shaking episodes, loss of consciousness present, some confusion after wards, No tongue biting, no loss of bowel and bladder control. No hypoglycemic episodes reported.     OUTSIDE RECORDS:    historical medical records   Patient, 45 years of age and right-handed, with additional diagnoses of dyslipidemia, bipolar depression, and past nephrolithiasis, presents for further evaluation treatment regarding ongoing headache issues.  She was accompanied by her fianceMechelle.  Patient relates the onset of her headaches to mid to late teen years.  Headaches have been for the most part stereotyped.  Generally right hemicranial although occasionally left hemicranial onset.  Described as pulsatile.  Associated with light and sound sensitivity, nausea and occasional vomiting.  Relates that occasionally the headaches are preceded by a visual aura.  In the recent past her headaches have escalated in frequency to the point where she was experiencing prior to the initiation of propranolol by the PCP 3-4 headaches on a weekly basis.  Per protocol has been initiated.  Most recently at a dose of 80 mg daily.  On the propranolol she has had a significant reduction in frequency to now perhaps 3-4 migraines per month.  However, her main issue is with significant headache issues during her menses.  On questioning she does have a tendency to fluid retention during her menses.  Her headaches do respond to sumatriptan.  Tolerating her current propranolol schedule with no reported adverse side effects.  Does report a family history of migraine (mother).  CURRENT OUTPATIENT MEDICATIONS:    Melissa Harrington has a current medication list which includes the following prescription(s): qulipta, lamotrigine, lorazepam,  "pantoprazole, rosuvastatin, sumatriptan, benzonatate, ibuprofen, prednisone, prednisone, and propranolol.       PHYSICAL EXAMINATION:   VITAL SIGNS:  height is 5' 3\" (1.6 m) and weight is 67.6 kg (149 lb). Her blood pressure is 98/60 and her pulse is 76. Her oxygen saturation is 98%.        NEUROLOGICAL EXAMINATION:    MENTAL STATUS EXAM: Alert, oriented to time place and person     CRANIAL NERVE EXAMINATION:  I Not tested   II Normal visual fields to finger counting.   II, Ill,  IV, VI Pupils were symmetric, briskly reactive. No afferent pupillary defect.  Eye movements are full without nystagmus. No ptosis.   V Facial sensation were intact   VII Facial movements were symmetrical    VIII Hearing was intact   IX, X Intact   XI Shoulder shrug and head turn was normal   XII Tongue protrusion is in the mid line.          MOTOR EXAM:        Arm Right  Left Leg Right  Left   Deltoid 5/5 5/5 lliopsoas 5/5 5/5   Biceps 5/5 5/5 Quads 5/5 5/5   Triceps 5/5 5/5 Hamstrings 5/5 5/5   Wrist Extension 5/5 5/5 Ankle Dorsi Flexion 5/5 5/5   Wrist Flexion 5/5 5/5 Ankle Plantar Flexion 5/5 5/5               DEEP TENDON REFLEXES:     Brachioradialis Biceps Triceps Patellar Achilles   Right 2+ 2+ 2+ 3+ 2+   Left 2+ 2+ 2+ 3+ 2+            SENSORY EXAMINATION:   Sensation intact     COORDINATION:   normal finger to nose testing     GAIT/STATION:   normal        DIAGNOSTIC STUDIES:   PERTINENT LABS:     Lab Results   Component Value Date    WBC 4.9 04/25/2023     Lab Results   Component Value Date    HGB 12.0 04/25/2023     Lab Results   Component Value Date     04/25/2023     Lab Results   Component Value Date    LYMPHSABS 2.3 09/27/2021     No results found for: \"LABLYMP\"  Lab Results   Component Value Date    EOSABS 0.2 09/27/2021     No components found for: \"NEUTROPHILS\"    No results found for: \"LABMONO\"         No results found for: \"LABGLUC\"  Lab Results   Component Value Date    CREATININE 0.71 04/25/2023     Lab Results " "  Component Value Date    AST 18 04/25/2023     Lab Results   Component Value Date    ALT 9 04/25/2023     Lab Results   Component Value Date    ALKPHOS 42 (L) 05/15/2020     Lab Results   Component Value Date    AST 18 04/25/2023        No results found for: \"FOLATE\"       Lab Results   Component Value Date    TSH 1.510 04/25/2023            NEUROIMAGING:  Results for orders placed during the hospital encounter of 01/24/24    MRI brain with and without contrast    Impression  No mass effect, acute intracranial hemorrhage or evidence of recent infarction. No abnormal parenchymal or leptomeningeal enhancement identified.    T1 shortening within the left thalamus is consistent with paradoxical calcification as correlated with prior CT. Findings are stable since 2011.    Prominent left Virchow-Jakub space is also noted and stable since 2011.    Workstation performed: [id number]      CT BRAIN - WITHOUT CONTRAST     INDICATION:   head injury LOC.     COMPARISON:  August 20, 2018     TECHNIQUE:  CT examination of the brain was performed.  In addition to axial images, coronal 2D reformatted images were created and submitted for interpretation.       Radiation dose length product (DLP) for this visit:  947.96 mGy-cm .  This examination, like all CT scans performed in the [facility], was performed utilizing techniques to minimize radiation dose exposure, including the use of iterative   reconstruction and automated exposure control.       IMAGE QUALITY:  Diagnostic.     FINDINGS:     PARENCHYMA:  No intracranial mass, mass effect or midline shift. No CT signs of acute infarction.  No acute parenchymal hemorrhage.  Stable lacunar infarcts in the left basal ganglion. Stable coarse calcifications in the basal ganglia more pronounced on   the left than right     VENTRICLES AND EXTRA-AXIAL SPACES:  Normal for the patient's age.     VISUALIZED ORBITS AND PARANASAL SINUSES:  Unremarkable.     CALVARIUM AND " EXTRACRANIAL SOFT TISSUES:  Normal.        IMPRESSION:  No acute intracranial abnormality.     EEG Interpretation:  This 41-60 minute EEG  recorded during wakefulness, drowsiness, and sleep is normal.  However, normal EEG doesn't rule out seizure disorder. Clinical correlation is recommended.                 ASSESSMENT AND PLAN:    Ms. Harrington is a 48 y.o.female  presenting with headaches follow-up. Patient  has a past medical history of Constipation, Family history of colonic polyps, GERD (gastroesophageal reflux disease), Headache, Headache(784.0), HL (hearing loss), Hyperlipidemia, Migraine, Psychiatric disorder, and Suicide and self-inflicted injury (HCC)..  She did underwent MRI of the brain which was showing some left thalamic calcification which has been stable since 2011 along with nasopharyngeal cyst.  She has already failed a number of different medications for which she was requested to start the approval for the Botox but we are still not able to get the approval from the insurance in the meantime she was started on Qulipta which seems to have helped with her headaches along with Imitrex as a rescue medication     The encounter diagnosis was Chronic migraine without aura, with status migrainosus.    Prophylactic medication: You have to take it daily to help prevent the headaches.  Continue with Qulipta 30 mg daily.  If she has breakthrough headaches we can increase the dose to 60 mg daily .     Rescue medications.  Continue with Imitrex on as needed basis as a rescue medicine for your migraine headaches. Please take the medicine on the first sign of your migraine headaches.  You can repeat the dose after 2 hours but no more than 3 tabs in 24 hours.Side effects were discussed in detail.       Botox approval is still pending and if approved we can still continue using the Qulipta with the Botox         Return in about 6 months (around 1/9/2025), or if symptoms worsen or fail to improve.       The management  plan was discussed in detail with the patient and all questions were answered.     Ms. Harrington was encouraged to contact our office with any questions or concerns and to contact the clinic or go to the nearest emergency room if symptoms change or worsen.       I have spent a total of 41 min in reviewing and/or ordering tests, medications, or procedures, performing an examination or evaluation, reviewing pertinent history, counseling and educating the patient, referring and/or communicating with other health care professionals, documenting in the EMR and general coordination of care of Ms. Harrington  today.           Misty Guevara MD.   Staff Neurologist,   Neuroimmunology and Neuroinfectious disease  07/09/24     This report has been created through the use of voice recognition/text compilation software.  Typographical and content errors may occur with this process.  While efforts are made to detect and correct such errors, in some cases errors will persist.  For this reason, wording in this document should be considered in the proper context and not strictly verbatim.  If, when reviewing the document, an error is discovered, please let the office know at [phone]

## 2024-07-21 DIAGNOSIS — G43.009 MIGRAINE WITHOUT AURA AND WITHOUT STATUS MIGRAINOSUS, NOT INTRACTABLE: ICD-10-CM

## 2024-07-23 RX ORDER — SUMATRIPTAN 100 MG/1
TABLET, FILM COATED ORAL
Qty: 9 TABLET | Refills: 3 | Status: SHIPPED | OUTPATIENT
Start: 2024-07-23

## 2024-07-23 RX ORDER — SUMATRIPTAN 100 MG/1
TABLET, FILM COATED ORAL
Qty: 9 TABLET | Refills: 3 | Status: SHIPPED | OUTPATIENT
Start: 2024-07-23 | End: 2024-07-23 | Stop reason: SDUPTHER

## 2024-07-23 NOTE — TELEPHONE ENCOUNTER
"[facility] calling to inform that script for Sumatriptan they received today has discrepancy.    Sig states \"REPEAT THE DOSE IN 2 HOURS BUT NOT MORE THAN 3 DOSES IN 24 HOURS.    Pharmacist says this really should be no more that 2 dosed in 24 hours. Asking if agreeable to change script to this.    Verbal order placed to make change for script to read:    TAKE 1 TABLET BY MOUTH IF NEEDED FOR MIGRAINE (TAKE 1 TABLET AT THE START OF THE HEADACHE. IF NO RELIEF CAN REPEAT THE DOSE IN 2 HOURS BUT NOT MORE THAN 2 DOSES IN 24 HOURS)     Dr. Guevara - Rx entered with adjusted sig. Order placed as \"phone in\". Please review and sign if in agreement. Thank you!      "

## 2024-08-07 ENCOUNTER — TELEPHONE (OUTPATIENT)
Age: 49
End: 2024-08-07

## 2024-08-07 NOTE — TELEPHONE ENCOUNTER
----- Message from Natacha LITTLE sent at 8/7/2024  8:16 AM EDT -----  Patient is approved for botox. Please schedule binj, use optum. Thank you

## 2024-08-07 NOTE — TELEPHONE ENCOUNTER
----- Message from Misty Guevara MD sent at 8/7/2024  9:32 AM EDT -----  Thank you,     Dr. Ismael MD.   08/07/24   9:32 AM  ----- Message -----  From: Natacha Kline  Sent: 8/7/2024   8:16 AM EDT  To: Shawna Kingsley MA; #    Patient is approved for botox. Please schedule binj, use optum. Thank you   Patient denied setting up video visit. She states \"she does not do video chat but will do a phone call\"    Advised Dr. Vance office will call patient when ready. Either before or after 9:30 AM

## 2024-08-14 ENCOUNTER — PATIENT MESSAGE (OUTPATIENT)
Age: 49
End: 2024-08-14

## 2024-08-14 ENCOUNTER — OFFICE VISIT (OUTPATIENT)
Dept: FAMILY MEDICINE CLINIC | Facility: CLINIC | Age: 49
End: 2024-08-14
Payer: COMMERCIAL

## 2024-08-14 VITALS
DIASTOLIC BLOOD PRESSURE: 70 MMHG | WEIGHT: 146 LBS | OXYGEN SATURATION: 96 % | RESPIRATION RATE: 18 BRPM | BODY MASS INDEX: 25.87 KG/M2 | SYSTOLIC BLOOD PRESSURE: 106 MMHG | TEMPERATURE: 100.7 F | HEIGHT: 63 IN | HEART RATE: 116 BPM

## 2024-08-14 DIAGNOSIS — R19.7 DIARRHEA, UNSPECIFIED TYPE: Primary | ICD-10-CM

## 2024-08-14 PROCEDURE — 99214 OFFICE O/P EST MOD 30 MIN: CPT | Performed by: FAMILY MEDICINE

## 2024-08-14 RX ORDER — CEPHALEXIN 500 MG/1
500 CAPSULE ORAL 3 TIMES DAILY
COMMUNITY
Start: 2024-08-09 | End: 2024-08-14

## 2024-08-14 RX ORDER — VANCOMYCIN HYDROCHLORIDE 125 MG/1
125 CAPSULE ORAL 4 TIMES DAILY
Qty: 40 CAPSULE | Refills: 0 | Status: SHIPPED | OUTPATIENT
Start: 2024-08-14 | End: 2024-08-24

## 2024-08-14 NOTE — PROGRESS NOTES
Chief Complaint   Patient presents with   • Generalized Body Aches     Symptoms began yesterday morning    Took 2 COVID tests last night and both came back negative   • Fever   • Chills   • Headache   • Diarrhea        Patient ID: Melissa Harrington is a 48 y.o. female.    Diarrhea   This is a new problem. The current episode started yesterday. The problem occurs more than 10 times per day. The problem has been unchanged. The stool consistency is described as Watery. The patient states that diarrhea awakens her from sleep. Associated symptoms include bloating, chills, a fever, headaches and sweats. Pertinent negatives include no abdominal pain, arthralgias, coughing, increased  flatus, myalgias, URI, vomiting or weight loss. Nothing aggravates the symptoms. Risk factors include recent antibiotic use. She has tried increased fluids for the symptoms. The treatment provided no relief. There is no history of bowel resection, inflammatory bowel disease, irritable bowel syndrome or a recent abdominal surgery.         The following portions of the patient's history were reviewed and updated as appropriate: allergies, current medications, past family history, past medical history, past social history, past surgical history and problem list.    Review of Systems   Constitutional:  Positive for activity change, appetite change, chills, fatigue and fever. Negative for weight loss.   HENT: Negative.     Respiratory: Negative.  Negative for cough.    Cardiovascular: Negative.    Gastrointestinal:  Positive for bloating and diarrhea. Negative for abdominal pain, flatus, nausea and vomiting.   Genitourinary: Negative.    Musculoskeletal:  Negative for arthralgias and myalgias.   Neurological:  Positive for headaches.       Current Outpatient Medications   Medication Sig Dispense Refill   • Atogepant (Qulipta) 30 MG TABS Take 30 mg by mouth daily in the early morning 90 tablet 3   • cephalexin (KEFLEX) 500 mg capsule Take 500 mg by  "mouth Three times a day -  stopped this am      • lamoTRIgine (LaMICtal) 25 MG CHEW daily at bedtime     • LORazepam (ATIVAN) 1 mg tablet Take 1 mg by mouth 2 (two) times a day       • pantoprazole (PROTONIX) 40 mg tablet TAKE ONE TABLET BY MOUTH EVERY DAY 30 tablet 2   • rosuvastatin (CRESTOR) 10 MG tablet TAKE ONE TABLET BY MOUTH EVERY DAY AT BEDTIME 90 tablet 1   • SUMAtriptan (IMITREX) 100 mg tablet TAKE 1 TABLET BY MOUTH IF NEEDED FOR MIGRAINE (TAKE 1 TABLET AT THE START OF THE HEADACHE. IF NO RELIEF CAN REPEAT THE DOSE IN 2 HOURS BUT NOT MORE THAN 2 DOSES IN 24 HOURS) 9 tablet 3     No current facility-administered medications for this visit.       Objective:    /70 (BP Location: Left arm, Patient Position: Sitting, Cuff Size: Standard)   Pulse (!) 116   Temp (!) 100.7 °F (38.2 °C) (Temporal)   Resp 18   Ht 5' 3\" (1.6 m)   Wt 66.2 kg (146 lb)   SpO2 96%   BMI 25.86 kg/m²        Physical Exam  Constitutional:       General: She is not in acute distress.  Cardiovascular:      Rate and Rhythm: Regular rhythm. Tachycardia present.      Heart sounds: No murmur heard.  Pulmonary:      Effort: No respiratory distress.      Breath sounds: No wheezing, rhonchi or rales.   Abdominal:      General: Bowel sounds are increased.      Palpations: Abdomen is soft.      Tenderness: There is generalized abdominal tenderness (mild discomfort). There is no guarding or rebound.   Skin:     Coloration: Skin is not pale.   Neurological:      Mental Status: She is alert.           Labs in chart were reviewed.      Assessment/Plan:         Diagnoses and all orders for this visit:    Diarrhea, unspecified type  -     Clostridium difficile toxin by PCR with EIA; Future  -     fidaxomicin (DIFICID) tablet; Take 1 tablet (200 mg total) by mouth 2 (two) times a day for 10 days ( first choice )       or  -     vancomycin (VANCOCIN) 125 MG capsule; Take 1 capsule (125 mg total) by mouth 4 (four) times a day for 10 days  -     " Clostridium difficile toxin by PCR with EIA  Oral hydration  Brat diet   Other orders  -    will stop cephalexin (KEFLEX) 500 mg capsule; Take 500 mg by mouth Three times a day        Rto prn                     Jennifer Bo MD

## 2024-08-15 DIAGNOSIS — G43.009 MIGRAINE WITHOUT AURA AND WITHOUT STATUS MIGRAINOSUS, NOT INTRACTABLE: Primary | ICD-10-CM

## 2024-08-15 LAB — C DIFF TOX GENS STL QL NAA+PROBE: POSITIVE

## 2024-08-15 RX ORDER — DEXAMETHASONE 2 MG/1
TABLET ORAL
Qty: 5 TABLET | Refills: 0 | Status: SHIPPED | OUTPATIENT
Start: 2024-08-15 | End: 2024-08-16 | Stop reason: SDUPTHER

## 2024-08-16 RX ORDER — DEXAMETHASONE 2 MG/1
TABLET ORAL
Qty: 5 TABLET | Refills: 0 | Status: SHIPPED | OUTPATIENT
Start: 2024-08-16

## 2024-09-05 ENCOUNTER — OFFICE VISIT (OUTPATIENT)
Dept: CARDIOLOGY CLINIC | Facility: CLINIC | Age: 49
End: 2024-09-05
Payer: COMMERCIAL

## 2024-09-05 VITALS
WEIGHT: 147 LBS | DIASTOLIC BLOOD PRESSURE: 68 MMHG | SYSTOLIC BLOOD PRESSURE: 102 MMHG | HEIGHT: 63 IN | HEART RATE: 69 BPM | BODY MASS INDEX: 26.05 KG/M2 | OXYGEN SATURATION: 98 %

## 2024-09-05 DIAGNOSIS — R55 SYNCOPE, UNSPECIFIED SYNCOPE TYPE: Primary | ICD-10-CM

## 2024-09-05 DIAGNOSIS — E78.2 ELEVATED TRIGLYCERIDES WITH HIGH CHOLESTEROL: ICD-10-CM

## 2024-09-05 DIAGNOSIS — E78.2 MIXED HYPERLIPIDEMIA: ICD-10-CM

## 2024-09-05 PROCEDURE — 99214 OFFICE O/P EST MOD 30 MIN: CPT | Performed by: INTERNAL MEDICINE

## 2024-09-05 PROCEDURE — 93000 ELECTROCARDIOGRAM COMPLETE: CPT | Performed by: INTERNAL MEDICINE

## 2024-09-05 RX ORDER — ROSUVASTATIN CALCIUM 10 MG/1
10 TABLET, COATED ORAL
Qty: 90 TABLET | Refills: 3 | Status: SHIPPED | OUTPATIENT
Start: 2024-09-05

## 2024-09-05 NOTE — PROGRESS NOTES
[facility]  [address] [place] [address] [address]   [place] [address]  Cardiology Consultation    Melissa Harrington  [id number]  1975      Consult for:  Appreciate consult by: Jennifer Bo MD    1. Syncope, unspecified syncope type  POCT ECG      2. Elevated triglycerides with high cholesterol  POCT ECG         Discussion/Summary:   Syncope-she has a classic prodrome of feeling unwell, tunnel vision prior to episodes. BP stable.  Possible vasovagal. very infrequent events.  Her resting twelve-lead EKG shows normal sinus rhythm without acute ST-T wave changes.  Normal auscultatory exam examination.  Her orthostatics here were negative.  We discussed about behavior modifications.  She will continue to hydrate herself.  She will wear compression when sitting and standing.  She will eat small meals throughout the day.  Cannot completely rule out postictal symptoms-possible neurologic?  She does have a history of migraines.  Possible complex migraines?    Hyperlipidemia-her LDL is elevated.  She had a prior advanced lipid panel.  We will target an LDL below 70.  She will restart rosuvastatin 10 mg    HPI:   47-year-old with a history of hyperlipidemia presents with periodic syncopal episodes.  She states having infrequent episodes of feeling unwell, tunnel vision, palpitation and then quick syncope.  Her partner states she is staring out and knows it is coming.  No prior history of seizures.  Denies it associated with migraines.  Denies having chest heaviness.  She exercises daily without limitations.  Denies having exertional shortness of breath.  Denies PND orthopnea.    Recent Visit: Denies having chest pain or major change in breathing. Denies significant palpitations. Compliant with therapy. Reviewed labwork together.  3 mills in the morning no limitations. Went to [facility]    Past Medical History:   Diagnosis Date    Constipation     Family history of colonic polyps      GERD (gastroesophageal reflux disease)     Headache     Headache(784.0)     HL (hearing loss)     Hyperlipidemia     Migraine     Psychiatric disorder     Suicide and self-inflicted injury (HCC)      Social History     Socioeconomic History    Marital status: /Civil Union     Spouse name: Not on file    Number of children: Not on file    Years of education: Not on file    Highest education level: Not on file   Occupational History    Not on file   Tobacco Use    Smoking status: Never    Smokeless tobacco: Never   Vaping Use    Vaping status: Never Used   Substance and Sexual Activity    Alcohol use: Never     Comment: rare    Drug use: No    Sexual activity: Yes     Partners: Female   Other Topics Concern    Not on file   Social History Narrative    Not on file     Social Determinants of Health     Financial Resource Strain: Not on file   Food Insecurity: Not on file   Transportation Needs: Not on file   Physical Activity: Not on file   Stress: Not on file   Social Connections: Not on file   Intimate Partner Violence: Not on file   Housing Stability: Not on file      Family History   Problem Relation Age of Onset    COPD Mother     Heart disease Father     Cancer Maternal Grandmother     Cancer Maternal Grandfather     Colon cancer Maternal Grandfather     Cancer Maternal Aunt      Past Surgical History:   Procedure Laterality Date    COLONOSCOPY      NO PAST SURGERIES         Current Outpatient Medications:     Atogepant (Qulipta) 30 MG TABS, Take 30 mg by mouth daily in the early morning, Disp: 90 tablet, Rfl: 3    dexamethasone (DECADRON) 2 mg tablet, Take 1 po daily with food x 5 days, Disp: 5 tablet, Rfl: 0    lamoTRIgine (LaMICtal) 25 MG CHEW, daily at bedtime, Disp: , Rfl:     LORazepam (ATIVAN) 1 mg tablet, Take 1 mg by mouth 2 (two) times a day  , Disp: , Rfl:     pantoprazole (PROTONIX) 40 mg tablet, TAKE ONE TABLET BY MOUTH EVERY DAY, Disp: 30 tablet, Rfl: 2    rosuvastatin (CRESTOR) 10 MG tablet,  "TAKE ONE TABLET BY MOUTH EVERY DAY AT BEDTIME, Disp: 90 tablet, Rfl: 1    SUMAtriptan (IMITREX) 100 mg tablet, TAKE 1 TABLET BY MOUTH IF NEEDED FOR MIGRAINE (TAKE 1 TABLET AT THE START OF THE HEADACHE. IF NO RELIEF CAN REPEAT THE DOSE IN 2 HOURS BUT NOT MORE THAN 2 DOSES IN 24 HOURS), Disp: 9 tablet, Rfl: 3  No Known Allergies  Vitals:    09/05/24 1618   BP: 102/68   BP Location: Right arm   Patient Position: Sitting   Cuff Size: Standard   Pulse: 69   SpO2: 98%   Weight: 66.7 kg (147 lb)   Height: 5' 3\" (1.6 m)       Review of Systems:   Review of Systems   Constitutional: Negative.  Negative for activity change, appetite change, chills, diaphoresis, fatigue, fever and unexpected weight change.   HENT: Negative.  Negative for congestion, dental problem, drooling, ear discharge, ear pain, facial swelling, hearing loss, mouth sores, nosebleeds, postnasal drip, rhinorrhea, sinus pressure, sinus pain, sneezing, sore throat, tinnitus, trouble swallowing and voice change.    Eyes: Negative.  Negative for photophobia, pain, redness, itching and visual disturbance.   Respiratory: Negative.  Negative for apnea, cough, choking, chest tightness, shortness of breath, wheezing and stridor.    Cardiovascular: Negative.  Negative for chest pain, palpitations and leg swelling.   Gastrointestinal: Negative.  Negative for abdominal distention, abdominal pain, anal bleeding, blood in stool, constipation, diarrhea, nausea, rectal pain and vomiting.   Endocrine: Negative.  Negative for cold intolerance, heat intolerance, polydipsia, polyphagia and polyuria.   Genitourinary: Negative.  Negative for decreased urine volume, difficulty urinating, dyspareunia, dysuria, enuresis, flank pain, frequency, genital sores, hematuria, menstrual problem, pelvic pain, urgency, vaginal bleeding, vaginal discharge and vaginal pain.   Musculoskeletal: Negative.  Negative for arthralgias, back pain, gait problem, joint swelling, myalgias, neck pain and " "neck stiffness.   Skin: Negative.  Negative for color change, pallor, rash and wound.   Allergic/Immunologic: Negative.  Negative for environmental allergies, food allergies and immunocompromised state.   Neurological:  Positive for syncope. Negative for dizziness, tremors, seizures, facial asymmetry, speech difficulty, weakness, light-headedness, numbness and headaches.   Hematological: Negative.  Negative for adenopathy. Does not bruise/bleed easily.   Psychiatric/Behavioral: Negative.  Negative for agitation, behavioral problems, confusion, decreased concentration, dysphoric mood, hallucinations, self-injury, sleep disturbance and suicidal ideas. The patient is not nervous/anxious and is not hyperactive.    All other systems reviewed and are negative.      Vitals:    09/05/24 1618   BP: 102/68   BP Location: Right arm   Patient Position: Sitting   Cuff Size: Standard   Pulse: 69   SpO2: 98%   Weight: 66.7 kg (147 lb)   Height: 5' 3\" (1.6 m)     Physical Examination:   Physical Exam  Constitutional:       General: She is not in acute distress.     Appearance: She is well-developed. She is not diaphoretic.   HENT:      Head: Normocephalic and atraumatic.      Right Ear: External ear normal.      Left Ear: External ear normal.   Eyes:      General: No scleral icterus.        Right eye: No discharge.         Left eye: No discharge.      Conjunctiva/sclera: Conjunctivae normal.      Pupils: Pupils are equal, round, and reactive to light.   Neck:      Thyroid: No thyromegaly.      Vascular: No JVD.      Trachea: No tracheal deviation.   Cardiovascular:      Rate and Rhythm: Normal rate and regular rhythm.      Heart sounds: No murmur heard.     No friction rub. No gallop.   Pulmonary:      Effort: Pulmonary effort is normal. No respiratory distress.      Breath sounds: Normal breath sounds. No stridor. No wheezing or rales.   Chest:      Chest wall: No tenderness.   Abdominal:      General: Bowel sounds are normal. " "There is no distension.      Palpations: Abdomen is soft. There is no mass.      Tenderness: There is no abdominal tenderness. There is no guarding or rebound.   Musculoskeletal:         General: No tenderness or deformity. Normal range of motion.      Cervical back: Normal range of motion and neck supple.   Skin:     General: Skin is warm and dry.      Coloration: Skin is not pale.      Findings: No erythema or rash.   Neurological:      Mental Status: She is alert and oriented to person, place, and time.      Cranial Nerves: No cranial nerve deficit.      Motor: No abnormal muscle tone.      Coordination: Coordination normal.      Deep Tendon Reflexes: Reflexes are normal and symmetric. Reflexes normal.   Psychiatric:         Behavior: Behavior normal.         Thought Content: Thought content normal.         Judgment: Judgment normal.         Labs:     Lab Results   Component Value Date    WBC 4.9 04/25/2023    HGB 12.0 04/25/2023    HCT 37.3 04/25/2023    MCV 85 04/25/2023    RDW 12.3 04/25/2023     04/25/2023     BMP:  Lab Results   Component Value Date    SODIUM 144 04/25/2023    K 4.2 04/25/2023     (H) 04/25/2023    CO2 25 04/25/2023    BUN 18 04/25/2023    CREATININE 0.71 04/25/2023    GLUC 90 04/25/2023    CALCIUM 9.2 05/15/2020    EGFR 104 04/25/2023    MG 1.8 06/28/2019     LFT:  Lab Results   Component Value Date    AST 18 04/25/2023    ALT 9 04/25/2023    ALKPHOS 42 (L) 05/15/2020    TP 6.6 04/25/2023    ALB 4.5 04/25/2023      No results found for: \"[id number]\"  Lab Results   Component Value Date    HGBA1C 5.2 04/25/2023     Lipid Profile:   Lab Results   Component Value Date    CHOLESTEROL 150 11/20/2023    HDL 71 11/20/2023    LDLCALC 68 11/20/2023    TRIG 50 11/20/2023     Lab Results   Component Value Date    CHOLESTEROL 150 11/20/2023    CHOLESTEROL 236 (H) 04/25/2023     Lab Results   Component Value Date    CKTOTAL 44 08/06/2017    TROPONINI <0.02 09/17/2019       Imaging & " "Testing   I have personally reviewed pertinent reports.      Cardiac Testing       EKG: Personally reviewed.    Normal sinus rhythm no acute ST-T wave changes      Shadi Lynn MD [facility]  [phone]  Please call with any questions or suggestions    Counseling :  A description of the counseling:   Goals and Barriers:  Patient's ability to self care:  Medication side effect reviewed with patient in detail and all their questions answered.    \"Portions of the record may have been created with voice recognition software. Occasional wrong word or \"sound a like\" substitutions may have occurred due to the inherent limitations of voice recognition software. Read the chart carefully and recognize, using context, where substitutions have occurred. Please call if you have any questions. \"    "

## 2024-10-15 ENCOUNTER — TELEPHONE (OUTPATIENT)
Age: 49
End: 2024-10-15

## 2024-10-15 NOTE — TELEPHONE ENCOUNTER
Botox number of units: 200  Botox quantity: 1  Arrived at what location: [place]  Botox at Correct Administering Location: yes  NDC number: 7160-0194-10  Lot number: [id number]  Expiration Date: 03/2027  Appt notes indicate correct medication: yes    Medication refill has been approved.

## 2024-10-22 ENCOUNTER — PROCEDURE VISIT (OUTPATIENT)
Age: 49
End: 2024-10-22
Payer: COMMERCIAL

## 2024-10-22 VITALS
DIASTOLIC BLOOD PRESSURE: 70 MMHG | SYSTOLIC BLOOD PRESSURE: 108 MMHG | TEMPERATURE: 97.3 F | OXYGEN SATURATION: 99 % | HEART RATE: 82 BPM

## 2024-10-22 DIAGNOSIS — G43.701 CHRONIC MIGRAINE WITHOUT AURA, WITH STATUS MIGRAINOSUS: Primary | ICD-10-CM

## 2024-10-22 PROCEDURE — 64615 CHEMODENERV MUSC MIGRAINE: CPT | Performed by: PSYCHIATRY & NEUROLOGY

## 2024-10-22 NOTE — PROGRESS NOTES
"Universal Protocol   Consent: Written consent obtained.  Risks and benefits: risks, benefits and alternatives were discussed  Consent given by: patient  Time out: Immediately prior to procedure a \"time out\" was called to verify the correct patient, procedure, equipment, support staff and site/side marked as required.  Timeout called at: 10/22/2024 10:53 AM.  Patient understanding: patient states understanding of the procedure being performed  Patient consent: the patient's understanding of the procedure matches consent given  Procedure consent: procedure consent matches procedure scheduled  Relevant documents: relevant documents present and verified  Patient identity confirmed: verbally with patient      Chemodenervation     Date/Time  10/22/2024 10:30 AM     Performed by  Misty Guevara MD   Authorized by  Misty Guevara MD     Pre-procedure details      Prepped With: Alcohol     Procedure details      Position:  Supine   Botox      Botox Type:  Type A    Brand:  Botox    mL's of Botulinum Toxin:  155    Needle Gauge:  30 G 2.5 inch   Procedures      Botox Procedures: chronic headache     Total Units      Total units used:  155    Total units discarded:  45   Post-procedure details      Chemodenervation:  Chronic migraine    Patient tolerance of procedure:  Tolerated well, no immediate complications   Comments       Procerus, L , R , L frontalis, R frontalis, L temporalis, R temporalis, R lateral occipitalis, L medial occipitalis, L inferior cervical paraspinal, R superior cervical paraspinal, R inferior cervical paraspinal, L superior cervical paraspinal, L inferior trapezius, R inferior trapezius, L superior trapezius and R superior trapezius    Comments:   10 units divided in 2 sites Procerus 5 units in 1 site  Frontalis 20 units divided in 4 sites  Temporalis 40 units divided in 8 sites  Occipitalis 30 units divided in 6 sites  Cervical paraspinals 20 " units divided in 4 sites  Trapezius 30 units divided in 6 sites    Follow up in 3 months              Misty Guevara MD.   Staff Neurologist    10/22/24   10:53 AM

## 2024-12-05 DIAGNOSIS — G43.009 MIGRAINE WITHOUT AURA AND WITHOUT STATUS MIGRAINOSUS, NOT INTRACTABLE: ICD-10-CM

## 2024-12-05 RX ORDER — SUMATRIPTAN SUCCINATE 100 MG/1
TABLET ORAL
Qty: 9 TABLET | Refills: 3 | Status: SHIPPED | OUTPATIENT
Start: 2024-12-05

## 2024-12-31 ENCOUNTER — TELEPHONE (OUTPATIENT)
Age: 49
End: 2024-12-31

## 2025-01-07 ENCOUNTER — TELEMEDICINE (OUTPATIENT)
Age: 50
End: 2025-01-07
Payer: COMMERCIAL

## 2025-01-07 VITALS — HEIGHT: 63 IN | WEIGHT: 147 LBS | BODY MASS INDEX: 26.05 KG/M2

## 2025-01-07 DIAGNOSIS — G43.701 CHRONIC MIGRAINE WITHOUT AURA, WITH STATUS MIGRAINOSUS: Primary | ICD-10-CM

## 2025-01-07 PROCEDURE — 98007 SYNCH AUDIO-VIDEO EST HI 40: CPT | Performed by: PSYCHIATRY & NEUROLOGY

## 2025-01-07 RX ORDER — ONDANSETRON 4 MG/1
4 TABLET, FILM COATED ORAL EVERY 8 HOURS PRN
Qty: 20 TABLET | Refills: 0 | Status: SHIPPED | OUTPATIENT
Start: 2025-01-07

## 2025-01-07 RX ORDER — PREDNISONE 10 MG/1
TABLET ORAL
Qty: 42 TABLET | Refills: 0 | Status: SHIPPED | OUTPATIENT
Start: 2025-01-07

## 2025-01-07 NOTE — TELEPHONE ENCOUNTER
Called patient to Check in for virtual visit and collect copayment. Received no answer and left voicemail message requesting a call back before appointment time to process registration.

## 2025-01-07 NOTE — PROGRESS NOTES
NEUROLOGY OUTPATIENT - TELEMEDICINE FOLLOW UP PATIENT VISIT NOTE      Name: Melissa Harrington       : 1975       MRN: [id number]   Encounter Provider: Misty Flores MD   Encounter Date: 2025  Encounter department: [facility] NEUROLOGY [facility]    The patient was identified by name and date of birth.   Ms. Harrington  was informed that this is a telemedicine visit and that the visit is being conducted through the Epic Embedded platform.  Ms. Harrington    agrees to proceed..  My office door was closed. No one else was in the room.   Ms. Harrington   acknowledged consent and understanding of privacy and security of the video platform.  Ms. aHrrington   agreed to participate and understands they can discontinue the visit at any time.     Verification of patient location:Patient is located at office in the following state in which I hold an active license;[place].     Patient is aware this is a billable service.         History of Present Illness     Ms. Harrington is a 49 y.o. female presenting with follow up regarding her Migraine        INTERIM HISTORY:   Frequency of headaches days : Other months 5 headache days per month, but in the past month she has been having headaches 10 headache days. (Working every day, stress from the holidays)  Intensity of the headaches days: about the same intensity    Medicine for headaches:   Qulipta 30 mg    Imitrex   Botox 10/22/2024    Side effects from the medications.denies having any side effects    Failed medications:   Topamax,   Imitrex  Maxalt  Pamelor    Any imaging including CTH or MRI of the brain: no acute findings. Some calcifications and a nasopharyngeal cyst.    Hydration: a lot of water  Sleep hygiene: stressful with holidays  Triggers for migraine headaches:stress, exhausted from work.      PRIOR NOTES:  Frequency of headaches days : 3-4 headaches per month which is a huge improvement from last time when she was getting daily headaches  Intensity of the headaches days:  less intense    Medicine for headaches: Qulipta 30 mg  and imitex     Side effects from the medications.  Denies having any side effects from the medication    Failed medications: Topamax, Imitrex Maxalt and Pamelor    Any imaging including CTH or MRI of the brain: no acute findings. Some calcifications and a nasopharyngeal cyst.    Since the last clinic visit patient reports that her Qulipta was not approved and she has been suffering from daily headaches  Frequency of headaches days : Daily headaches--the frequency seems to be getting worse  Intensity of the headaches days: The intensity seems to be getting worse  Medicine for headaches: Qulipta 30 mg (not approved by the insurance yet)  Side effects from the medications.  Failed medications: Topamax, Imitrex Maxalt and Pamelor.   Any imaging including CTH or MRI of the brain: no acute findings. Some calcifications and a nasopharyngeal cyst.   Feels a little bit down today secondary to getting the daily headaches as it is affecting her quality of life.  EEG which was ordered during the last clinic visit came back within normal limits       HEADACHE DESCRIPTION:    Onset of headaches: several years back, but they got worse in the last year or so.  Location of headaches: left-sided unilateral and occipital  Radiation: Yes   Quality of the pain: sharp and shooting  Intensity of the headache: At present: 6/10;  At its worst:  10/10  Duration of the headaches:hour(s)  Frequency of the headaches:daily  Presence of aura:  Yes, some ache in the back of the head  Associated symptoms with headaches: no vomiting , +nausea, +light sensitivity , and +sound sensitivity--She is also reporting some numbness in the left V1, tearing which lasts for couple of hours and then it gets better. Some visual complaints with her headaches. She is also having some memory complaints and has some problem with recall.   Triggers:No   Positional component: Yes, laying down makes them worse.   "  Alleviating factors: N/A   Any specific time of the day when get the headaches: no particular time of day    Family history of migraine headaches: Yes, mother had migraine headaches  History of neck and head trauma: No  Smoking status: No  Oral contraceptive use: N/A    Life style factors:  -Hydration: adequate  -Sleep: inadequate 4-5 hours  -Caffeine intake: 1 cups of ice tea at dinner  -Alcohol intake: none     Impact of headches:  -Number of headaches in past 30 days: 30/30 days.   -Number of days missed per month because of headache: No in the last 30 days.   -Number of ER visits for her headaches: No ER visit  in the last 30 days.       Treatment:  -Over the counter medications tried for headaches:   Tylenol been ineffective, Ibuprofen been ineffective, Naproxen been ineffective, and Excedrin migraine been ineffective --she has been taking it daily for the last many months?    -Prescription medications:  Abortive or Rescue Medications used:   Sumatriptan been helpful --She has been using the Imitrex about 9 tabs every month     Preventative or daily medications used for headaches:   Topamax been helpful but stopped it due to side effects         Red Flags for headache:  Age <5 or >50: Yes  First worst headaches: No  Maximal at intensity: No  Change in pattern: Yes  New headache in pregnancy, cancer or immunocompromised patient: No  Loss of consciousness or convulsions: No  Headache triggered by exertion, Valsalva maneuver or sexual activity: No  Abnormal exam: please see below in Neurological examination.    [id number], summer 2022. She would know when she is going to pass out, black curtain in front of her eyes, no shaking episodes, loss of consciousness present, some confusion after wards, No tongue biting, no loss of bowel and bladder control. No hypoglycemic episodes reported.     Objective          PHYSICAL EXAMINATION:   VITAL SIGNS:  height is 5' 3\" (1.6 m) and weight is 66.7 kg (147 lb).      "   NEUROLOGICAL EXAMINATION:    MENTAL STATUS EXAM: Alert, oriented to time place and person     Unable to assess as this is a telemedicine visit          DIAGNOSTIC STUDIES:   PERTINENT LABS:     Lab Results   Component Value Date    WBC 4.9 04/25/2023     Lab Results   Component Value Date    HGB 12.0 04/25/2023     Lab Results   Component Value Date     04/25/2023                NEUROIMAGING:  Results for orders placed during the hospital encounter of 01/24/24    MRI brain with and without contrast    Impression  No mass effect, acute intracranial hemorrhage or evidence of recent infarction. No abnormal parenchymal or leptomeningeal enhancement identified.    T1 shortening within the left thalamus is consistent with paradoxical calcification as correlated with prior CT. Findings are stable since 2011.    Prominent left Virchow-Jakub space is also noted and stable since 2011.    Workstation performed: [id number]      CT BRAIN - WITHOUT CONTRAST     INDICATION:   head injury LOC.     COMPARISON:  August 20, 2018     TECHNIQUE:  CT examination of the brain was performed.  In addition to axial images, coronal 2D reformatted images were created and submitted for interpretation.       Radiation dose length product (DLP) for this visit:  947.96 mGy-cm .  This examination, like all CT scans performed in the [facility], was performed utilizing techniques to minimize radiation dose exposure, including the use of iterative   reconstruction and automated exposure control.       IMAGE QUALITY:  Diagnostic.     FINDINGS:     PARENCHYMA:  No intracranial mass, mass effect or midline shift. No CT signs of acute infarction.  No acute parenchymal hemorrhage.  Stable lacunar infarcts in the left basal ganglion. Stable coarse calcifications in the basal ganglia more pronounced on   the left than right     VENTRICLES AND EXTRA-AXIAL SPACES:  Normal for the patient's age.     VISUALIZED ORBITS AND PARANASAL  SINUSES:  Unremarkable.     CALVARIUM AND EXTRACRANIAL SOFT TISSUES:  Normal.        IMPRESSION:  No acute intracranial abnormality.     EEG Interpretation:  This 41-60 minute EEG  recorded during wakefulness, drowsiness, and sleep is normal.  However, normal EEG doesn't rule out seizure disorder. Clinical correlation is recommended.              Assessment & Plan  Chronic migraine without aura, with status migrainosus  .Ms. Harrington is a very pleasant 49 y.o. female presenting with chronic migraine headaches follow-up/6 weeks post Botox injection follow-up.  She feels that after getting the Botox her headaches dropped to about 5 headaches a month but due to the recent stress around her work and not getting enough rest she has been having about 10 headaches a month.  She has been using Qulipta and Imitrex without any side effects but does mention that she has been having more nausea associated with her headaches.  Would recommend adding Zofran to prevent nausea.  I would recommend a prednisone taper to help break the headache cycle      Prophylactic medication: Continue with Botox every 3 months along with Qulipta 30 mg daily.    Rescue medications.  Continue with Imitrex on as needed basis as a rescue medicine for your migraine headaches. Please take the medicine on the first sign of your migraine headaches.  You can repeat the dose after 2 hours but no more than 3 tabs in 24 hours.Side effects were discussed in detail.     Orders:    predniSONE 10 mg tablet; Prednisone taper: Take 6 tabs for 2 days, 5 tabs for 2 days, 4 tabs for 2 days, 3 tabs for 2 days, 2 tabs for 2 days, 1 tabs for 2 days and then stop    ondansetron (ZOFRAN) 4 mg tablet; Take 1 tablet (4 mg total) by mouth every 8 (eight) hours as needed for nausea or vomiting      Botox appointment already scheduled for January 28     Return in about 6 months (around 7/7/2025).     Administrative Statements       The management plan was discussed in detail with  the patient and all questions were answered.     Ms. Harrington was encouraged to contact our office with any questions or concerns and to contact the clinic or go to the nearest emergency room if symptoms change or worsen.       I have spent a total of 41 min in reviewing and/or ordering tests, medications, or procedures, performing an examination or evaluation, reviewing pertinent history, counseling and educating the patient, referring and/or communicating with other health care professionals, documenting in the EMR and general coordination of care of Ms. Harrington  today.           Misty Guevara MD.   Staff Neurologist,   Neuroimmunology and Neuroinfectious disease  01/07/25     This report has been created through the use of voice recognition/text compilation software.  Typographical and content errors may occur with this process.  While efforts are made to detect and correct such errors, in some cases errors will persist.  For this reason, wording in this document should be considered in the proper context and not strictly verbatim.  If, when reviewing the document, an error is discovered, please let the office know at [phone]

## 2025-01-15 ENCOUNTER — TELEPHONE (OUTPATIENT)
Age: 50
End: 2025-01-15

## 2025-01-15 NOTE — TELEPHONE ENCOUNTER
Botox number of units: 200  Botox quantity: 1  Arrived at what location: [place]  Botox at Correct Administering Location: yes  NDC number: 7380-6335-62  Lot number: [id number]  Expiration Date: 04/2027  Appt notes indicate correct medication: yes

## 2025-01-28 ENCOUNTER — PROCEDURE VISIT (OUTPATIENT)
Age: 50
End: 2025-01-28
Payer: COMMERCIAL

## 2025-01-28 VITALS
TEMPERATURE: 96.9 F | SYSTOLIC BLOOD PRESSURE: 100 MMHG | HEART RATE: 69 BPM | DIASTOLIC BLOOD PRESSURE: 70 MMHG | OXYGEN SATURATION: 96 %

## 2025-01-28 DIAGNOSIS — G43.009 MIGRAINE WITHOUT AURA AND WITHOUT STATUS MIGRAINOSUS, NOT INTRACTABLE: Primary | ICD-10-CM

## 2025-01-28 PROCEDURE — 64615 CHEMODENERV MUSC MIGRAINE: CPT | Performed by: PSYCHIATRY & NEUROLOGY

## 2025-01-28 RX ORDER — ATOGEPANT 60 MG/1
TABLET ORAL
Qty: 90 TABLET | Refills: 3 | Status: SHIPPED | OUTPATIENT
Start: 2025-01-28

## 2025-01-28 NOTE — PROGRESS NOTES
"Universal Protocol   Consent: Verbal consent obtained. Written consent obtained.  Risks and benefits: risks, benefits and alternatives were discussed  Consent given by: patient  Time out: Immediately prior to procedure a \"time out\" was called to verify the correct patient, procedure, equipment, support staff and site/side marked as required.  Timeout called at: 1/28/2025 8:30 AM.  Patient understanding: patient states understanding of the procedure being performed  Patient consent: the patient's understanding of the procedure matches consent given  Procedure consent: procedure consent matches procedure scheduled  Patient identity confirmed: verbally with patient      Chemodenervation     Date/Time  1/28/2025 8:00 AM     Performed by  Misty Guevara MD   Authorized by  Misty Guevara MD     Pre-procedure details      Preparation: Patient was prepped and draped in usual sterile fashion     Botox      Botox Type:  Type A    Brand:  Botox    mL's of Botulinum Toxin:  155    Needle Gauge:  30 G 2.5 inch   Procedures      Botox Procedures: chronic headache     Total Units      Total units used:  155    Total units discarded:  45   Post-procedure details      Chemodenervation:  Chronic migraine    Patient tolerance of procedure:  Tolerated well, no immediate complications   Comments       Procerus, L , R , L frontalis, R frontalis, L temporalis, R temporalis, R lateral occipitalis, L medial occipitalis, L inferior cervical paraspinal, R superior cervical paraspinal, R inferior cervical paraspinal, L superior cervical paraspinal, L inferior trapezius, R inferior trapezius, L superior trapezius and R superior trapezius    Comments:   10 units divided in 2 sites Procerus 5 units in 1 site  Frontalis 20 units divided in 4 sites  Temporalis 40 units divided in 8 sites  Occipitalis 30 units divided in 6 sites  Cervical paraspinals 20 units divided in 4 sites  Trapezius 30 " units divided in 6 sites    Follow up in 3 months          Misty Guevara MD.   Staff Neurologist,   01/28/25  8:56 AM

## 2025-03-06 ENCOUNTER — RA CDI HCC (OUTPATIENT)
Dept: OTHER | Facility: HOSPITAL | Age: 50
End: 2025-03-06

## 2025-03-12 ENCOUNTER — OFFICE VISIT (OUTPATIENT)
Dept: FAMILY MEDICINE CLINIC | Facility: CLINIC | Age: 50
End: 2025-03-12
Payer: COMMERCIAL

## 2025-03-12 VITALS
WEIGHT: 144 LBS | HEIGHT: 63 IN | DIASTOLIC BLOOD PRESSURE: 70 MMHG | OXYGEN SATURATION: 98 % | BODY MASS INDEX: 25.52 KG/M2 | SYSTOLIC BLOOD PRESSURE: 110 MMHG | TEMPERATURE: 98 F | RESPIRATION RATE: 14 BRPM | HEART RATE: 72 BPM

## 2025-03-12 DIAGNOSIS — G47.00 INSOMNIA, UNSPECIFIED TYPE: ICD-10-CM

## 2025-03-12 DIAGNOSIS — N95.1 MENOPAUSAL HOT FLUSHES: ICD-10-CM

## 2025-03-12 DIAGNOSIS — Z00.00 ANNUAL PHYSICAL EXAM: Primary | ICD-10-CM

## 2025-03-12 DIAGNOSIS — Z12.31 ENCOUNTER FOR SCREENING MAMMOGRAM FOR BREAST CANCER: ICD-10-CM

## 2025-03-12 DIAGNOSIS — Z23 ENCOUNTER FOR IMMUNIZATION: ICD-10-CM

## 2025-03-12 PROBLEM — K59.00 CONSTIPATION: Status: RESOLVED | Noted: 2023-01-04 | Resolved: 2025-03-12

## 2025-03-12 PROBLEM — R55 SYNCOPE: Status: RESOLVED | Noted: 2023-06-06 | Resolved: 2025-03-12

## 2025-03-12 PROCEDURE — 99396 PREV VISIT EST AGE 40-64: CPT | Performed by: FAMILY MEDICINE

## 2025-03-12 PROCEDURE — 90715 TDAP VACCINE 7 YRS/> IM: CPT | Performed by: FAMILY MEDICINE

## 2025-03-12 PROCEDURE — 90471 IMMUNIZATION ADMIN: CPT | Performed by: FAMILY MEDICINE

## 2025-03-12 RX ORDER — TRAZODONE HYDROCHLORIDE 100 MG/1
100 TABLET ORAL
Qty: 30 TABLET | Refills: 5 | Status: SHIPPED | OUTPATIENT
Start: 2025-03-12

## 2025-03-12 NOTE — PROGRESS NOTES
Name: Melissa Harrington      : 1975      MRN: [id number]  Encounter Provider: Jennifer Bo MD  Encounter Date: 3/12/2025   Encounter department: [facility]    Assessment & Plan  Encounter for screening mammogram for breast cancer    Orders:  •  Mammo screening bilateral w 3d and cad; Future    Annual physical exam  Declined shingles vaccine   Orders:  •  CBC; Future  •  Comprehensive metabolic panel; Future  •  Lipid panel; Future  •  TSH, 3rd generation; Future  •  Hemoglobin A1C; Future  •  Vitamin D 25 hydroxy; Future    Menopausal hot flushes    Orders:  •  Ambulatory Referral to Obstetrics / Gynecology; Future    Insomnia, unspecified type    Orders:  •  traZODone (DESYREL) 100 mg tablet; Take 1 tablet (100 mg total) by mouth daily at bedtime    Encounter for immunization    Orders:  •  TDAP VACCINE GREATER THAN OR EQUAL TO 6YO IM      Depression Screening and Follow-up Plan: Patient's depression screening was positive with a PHQ-9 score of 5.   Clincally patient does not have depression. No treatment is required.         History of Present Illness     Pt is seeing for annual PE       Review of Systems   Constitutional:  Positive for fatigue. Negative for activity change, appetite change, fever and unexpected weight change.   HENT:  Negative for congestion, ear discharge, ear pain, hearing loss, rhinorrhea, sinus pressure, sore throat and trouble swallowing.    Eyes: Negative.    Respiratory: Negative.     Cardiovascular: Negative.    Gastrointestinal: Negative.    Endocrine: Negative.         Except for hot flushes at night x 2 m -  LMP 4 m ago    Genitourinary: Negative.    Musculoskeletal: Negative.    Skin: Negative.    Neurological:  Positive for headaches (improved on current meds -  under neurologist care). Negative for dizziness, weakness, light-headedness and numbness.   Hematological: Negative.    Psychiatric/Behavioral:  Positive for sleep disturbance (for over 1 y -   OTC meds do not help). Negative for agitation, behavioral problems, decreased concentration, dysphoric mood and suicidal ideas.      Past Medical History:   Diagnosis Date   • Constipation    • Family history of colonic polyps    • GERD (gastroesophageal reflux disease)    • Headache    • Headache(784.0)    • HL (hearing loss)    • Hyperlipidemia    • Migraine    • Psychiatric disorder    • Suicide and self-inflicted injury (HCC)    • Syncope June 2022    Passed out     Past Surgical History:   Procedure Laterality Date   • COLONOSCOPY     • NO PAST SURGERIES       Family History   Problem Relation Age of Onset   • COPD Mother    • Migraines Mother    • Heart disease Father    • Cancer Maternal Grandmother    • Cancer Maternal Grandfather    • Colon cancer Maternal Grandfather    • Cancer Maternal Aunt      Social History     Tobacco Use   • Smoking status: Never   • Smokeless tobacco: Never   Vaping Use   • Vaping status: Never Used   Substance and Sexual Activity   • Alcohol use: Never     Comment: rare   • Drug use: No   • Sexual activity: Yes     Partners: Female     Current Outpatient Medications on File Prior to Visit   Medication Sig   • Atogepant (Qulipta) 60 MG TABS Take 60 mg daily   • lamoTRIgine (LaMICtal) 25 MG CHEW daily at bedtime   • LORazepam (ATIVAN) 1 mg tablet Take 1 mg by mouth 2 (two) times a day     • rosuvastatin (CRESTOR) 10 MG tablet Take 1 tablet (10 mg total) by mouth daily at bedtime   • SUMAtriptan (IMITREX) 100 mg tablet TAKE 1 TABLET BY MOUTH IF NEEDED FOR MIGRAINE (TAKE 1 TABLET AT THE START OF THE HEADACHE. IF NO RELIEF CAN REPEAT THE DOSE IN 2 HOURS BUT NOT MORE THAN 2 DOSES IN 24 HOURS)   • [DISCONTINUED] dexamethasone (DECADRON) 2 mg tablet Take 1 po daily with food x 5 days (Patient not taking: Reported on 3/12/2025)   • [DISCONTINUED] ondansetron (ZOFRAN) 4 mg tablet Take 1 tablet (4 mg total) by mouth every 8 (eight) hours as needed for nausea or vomiting (Patient not taking:  "Reported on 3/12/2025)   • [DISCONTINUED] pantoprazole (PROTONIX) 40 mg tablet TAKE ONE TABLET BY MOUTH EVERY DAY (Patient not taking: Reported on 3/12/2025)   • [DISCONTINUED] predniSONE 10 mg tablet Prednisone taper: Take 6 tabs for 2 days, 5 tabs for 2 days, 4 tabs for 2 days, 3 tabs for 2 days, 2 tabs for 2 days, 1 tabs for 2 days and then stop     No Known Allergies  Immunization History   Administered Date(s) Administered   • COVID-19 MODERNA VACC 0.5 ML IM 01/18/2021   • COVID-19, unspecified 01/08/2021, 02/05/2021   • INFLUENZA 10/11/2019, 11/11/2020, 09/01/2022   • Influenza Quadrivalent 3 years and older 10/01/2024   • Influenza Quadrivalent Preservative Free 3 years and older IM 10/20/2017   • Influenza, recombinant, quadrivalent,injectable, preservative free 09/20/2021   • Influenza, seasonal, injectable 12/04/2012   • Pneumococcal Polysaccharide PPV23 12/04/2012   • Tdap 03/20/2014, 03/12/2025     Objective   /70 (BP Location: Left arm, Patient Position: Sitting, Cuff Size: Standard)   Pulse 72   Temp 98 °F (36.7 °C) (Temporal)   Resp 14   Ht 5' 3\" (1.6 m)   Wt 65.3 kg (144 lb)   SpO2 98%   BMI 25.51 kg/m²     Physical Exam    "

## 2025-03-17 ENCOUNTER — TELEPHONE (OUTPATIENT)
Age: 50
End: 2025-03-17

## 2025-03-17 NOTE — TELEPHONE ENCOUNTER
Patient's wife calling to see if can fax mammo script to [facility] at [phone].  Her appointment is for 4 pm today.

## 2025-03-18 DIAGNOSIS — Z12.31 ENCOUNTER FOR SCREENING MAMMOGRAM FOR BREAST CANCER: ICD-10-CM

## 2025-03-18 LAB
25(OH)D3+25(OH)D2 SERPL-MCNC: 32.4 NG/ML (ref 30–100)
ALBUMIN SERPL-MCNC: 4.5 G/DL (ref 3.8–4.9)
ALP SERPL-CCNC: 45 IU/L (ref 44–121)
ALT SERPL-CCNC: 9 IU/L (ref 0–32)
AST SERPL-CCNC: 21 IU/L (ref 0–40)
BILIRUB SERPL-MCNC: 0.4 MG/DL (ref 0–1.2)
BUN SERPL-MCNC: 20 MG/DL (ref 6–24)
BUN/CREAT SERPL: 27 (ref 9–23)
CALCIUM SERPL-MCNC: 9.7 MG/DL (ref 8.7–10.2)
CHLORIDE SERPL-SCNC: 106 MMOL/L (ref 96–106)
CHOLEST SERPL-MCNC: 146 MG/DL (ref 100–199)
CHOLEST/HDLC SERPL: 2.3 RATIO (ref 0–4.4)
CO2 SERPL-SCNC: 23 MMOL/L (ref 20–29)
CREAT SERPL-MCNC: 0.73 MG/DL (ref 0.57–1)
EGFR: 100 ML/MIN/1.73
ERYTHROCYTE [DISTWIDTH] IN BLOOD BY AUTOMATED COUNT: 12.5 % (ref 11.7–15.4)
EST. AVERAGE GLUCOSE BLD GHB EST-MCNC: 114 MG/DL
GLOBULIN SER-MCNC: 1.7 G/DL (ref 1.5–4.5)
GLUCOSE SERPL-MCNC: 91 MG/DL (ref 70–99)
HBA1C MFR BLD: 5.6 % (ref 4.8–5.6)
HCT VFR BLD AUTO: 38.4 % (ref 34–46.6)
HDLC SERPL-MCNC: 64 MG/DL
HGB BLD-MCNC: 12.9 G/DL (ref 11.1–15.9)
LDLC SERPL CALC-MCNC: 72 MG/DL (ref 0–99)
MCH RBC QN AUTO: 29 PG (ref 26.6–33)
MCHC RBC AUTO-ENTMCNC: 33.6 G/DL (ref 31.5–35.7)
MCV RBC AUTO: 86 FL (ref 79–97)
MICRODELETION SYND BLD/T FISH: NORMAL
PLATELET # BLD AUTO: 198 X10E3/UL (ref 150–450)
POTASSIUM SERPL-SCNC: 4.1 MMOL/L (ref 3.5–5.2)
PROT SERPL-MCNC: 6.2 G/DL (ref 6–8.5)
RBC # BLD AUTO: 4.45 X10E6/UL (ref 3.77–5.28)
SL AMB VLDL CHOLESTEROL CALC: 10 MG/DL (ref 5–40)
SODIUM SERPL-SCNC: 142 MMOL/L (ref 134–144)
TRIGL SERPL-MCNC: 41 MG/DL (ref 0–149)
TSH SERPL DL<=0.005 MIU/L-ACNC: 1 UIU/ML (ref 0.45–4.5)
WBC # BLD AUTO: 4.7 X10E3/UL (ref 3.4–10.8)

## 2025-03-21 ENCOUNTER — RESULTS FOLLOW-UP (OUTPATIENT)
Dept: FAMILY MEDICINE CLINIC | Facility: CLINIC | Age: 50
End: 2025-03-21

## 2025-03-24 NOTE — TELEPHONE ENCOUNTER
Patient was returning a VM, I advised her of the message from the provider and she would like to know what the next steps are. Please call patient back to further assists. Thank you.      ----- Message from Jennifer Bo MD sent at 3/21/2025  3:29 PM EDT -----  Pl, advise pt -  breast US showed 3 small cyst in R breast  -  benign

## 2025-03-24 NOTE — TELEPHONE ENCOUNTER
----- Message from Jennifer Bo MD sent at 3/21/2025  3:29 PM EDT -----  Pl, advise pt -  breast US showed 3 small cyst in R breast  -  benign

## 2025-03-25 ENCOUNTER — TELEPHONE (OUTPATIENT)
Dept: OBGYN CLINIC | Facility: CLINIC | Age: 50
End: 2025-03-25

## 2025-03-28 ENCOUNTER — OFFICE VISIT (OUTPATIENT)
Dept: OBGYN CLINIC | Facility: CLINIC | Age: 50
End: 2025-03-28
Payer: COMMERCIAL

## 2025-03-28 ENCOUNTER — APPOINTMENT (OUTPATIENT)
Dept: RADIOLOGY | Facility: CLINIC | Age: 50
End: 2025-03-28
Payer: COMMERCIAL

## 2025-03-28 VITALS — BODY MASS INDEX: 25.52 KG/M2 | HEIGHT: 63 IN | WEIGHT: 144 LBS

## 2025-03-28 DIAGNOSIS — M25.551 PAIN IN RIGHT HIP: ICD-10-CM

## 2025-03-28 DIAGNOSIS — M62.830 SPASM OF MUSCLE, BACK: Primary | ICD-10-CM

## 2025-03-28 PROCEDURE — 73502 X-RAY EXAM HIP UNI 2-3 VIEWS: CPT

## 2025-03-28 PROCEDURE — 99204 OFFICE O/P NEW MOD 45 MIN: CPT | Performed by: ORTHOPAEDIC SURGERY

## 2025-03-28 RX ORDER — CYCLOBENZAPRINE HCL 10 MG
10 TABLET ORAL 3 TIMES DAILY PRN
Qty: 20 TABLET | Refills: 0 | Status: SHIPPED | OUTPATIENT
Start: 2025-03-28

## 2025-03-28 RX ORDER — DICLOFENAC SODIUM 75 MG/1
75 TABLET, DELAYED RELEASE ORAL 2 TIMES DAILY
Qty: 30 TABLET | Refills: 0 | Status: SHIPPED | OUTPATIENT
Start: 2025-03-28

## 2025-04-05 DIAGNOSIS — G43.009 MIGRAINE WITHOUT AURA AND WITHOUT STATUS MIGRAINOSUS, NOT INTRACTABLE: ICD-10-CM

## 2025-04-07 RX ORDER — SUMATRIPTAN SUCCINATE 100 MG/1
TABLET ORAL
Qty: 9 TABLET | Refills: 3 | Status: SHIPPED | OUTPATIENT
Start: 2025-04-07

## 2025-04-15 ENCOUNTER — TELEPHONE (OUTPATIENT)
Age: 50
End: 2025-04-15

## 2025-04-15 NOTE — TELEPHONE ENCOUNTER
Botox number of units: 200  Botox quantity: 1  Arrived at what location: [place]  Botox at Correct Administering Location: yes  NDC number: 0715-5122-90  Lot number: [id number]  Expiration Date: 09/2027  Appt notes indicate correct medication: yes

## 2025-04-29 ENCOUNTER — PROCEDURE VISIT (OUTPATIENT)
Age: 50
End: 2025-04-29
Payer: COMMERCIAL

## 2025-04-29 VITALS
TEMPERATURE: 96.9 F | DIASTOLIC BLOOD PRESSURE: 60 MMHG | SYSTOLIC BLOOD PRESSURE: 90 MMHG | HEART RATE: 77 BPM | OXYGEN SATURATION: 99 %

## 2025-04-29 DIAGNOSIS — G43.009 MIGRAINE WITHOUT AURA AND WITHOUT STATUS MIGRAINOSUS, NOT INTRACTABLE: ICD-10-CM

## 2025-04-29 DIAGNOSIS — G43.701 CHRONIC MIGRAINE WITHOUT AURA, WITH STATUS MIGRAINOSUS: Primary | ICD-10-CM

## 2025-04-29 PROCEDURE — 64615 CHEMODENERV MUSC MIGRAINE: CPT | Performed by: PSYCHIATRY & NEUROLOGY

## 2025-04-29 RX ORDER — ATOGEPANT 60 MG/1
TABLET ORAL
Qty: 90 TABLET | Refills: 3 | Status: SHIPPED | OUTPATIENT
Start: 2025-04-29

## 2025-04-29 NOTE — PROGRESS NOTES
"Universal Protocol   Consent: Verbal consent obtained. Written consent obtained.  Risks and benefits: risks, benefits and alternatives were discussed  Consent given by: patient  Time out: Immediately prior to procedure a \"time out\" was called to verify the correct patient, procedure, equipment, support staff and site/side marked as required.  Timeout called at: 4/29/2025 8:15 AM.  Patient understanding: patient states understanding of the procedure being performed  Patient consent: the patient's understanding of the procedure matches consent given  Procedure consent: procedure consent matches procedure scheduled  Relevant documents: relevant documents present and verified  Patient identity confirmed: verbally with patient      Chemodenervation     Date/Time  4/29/2025 8:00 AM     Performed by  Misty Guevara MD   Authorized by  Misty Guevara MD     Pre-procedure details      Preparation: Patient was prepped and draped in usual sterile fashion     Botox      Botox Type:  Type A    Brand:  Botox    mL's of Botulinum Toxin:  155    Needle Gauge:  30 G 2.5 inch   Procedures      Botox Procedures: chronic headache      Indications: migraines     Total Units      Total units used:  155    Total units discarded:  45   Post-procedure details      Chemodenervation:  Chronic migraine    Facial Nerve Location::  Bilateral facial nerve    Patient tolerance of procedure:  Tolerated well, no immediate complications   Comments       Procerus, L , R , L frontalis, R frontalis, L temporalis, R temporalis, R lateral occipitalis, L medial occipitalis, L inferior cervical paraspinal, R superior cervical paraspinal, R inferior cervical paraspinal, L superior cervical paraspinal, L inferior trapezius, R inferior trapezius, L superior trapezius and R superior trapezius    Comments:   10 units divided in 2 sites Procerus 5 units in 1 site  Frontalis 20 units divided in 4 sites  " Temporalis 40 units divided in 8 sites  Occipitalis 30 units divided in 6 sites  Cervical paraspinals 20 units divided in 4 sites  Trapezius 30 units divided in 6 sites    Follow up in 3 months          Misty Guevara MD.   Staff Neurologist,   04/29/25  8:46 AM

## 2025-05-01 ENCOUNTER — TELEPHONE (OUTPATIENT)
Age: 50
End: 2025-05-01

## 2025-05-01 NOTE — TELEPHONE ENCOUNTER
PA Renewal Qulipta 30 mg submitted via [facility] key Code: [id number]

## 2025-05-01 NOTE — TELEPHONE ENCOUNTER
Reason for call:   [x] Prior Auth  [] Other:     Caller:  [] Patient  [x] Pharmacy  Name:   Address:   Callback Number:     Ordering Provider:   [] PCP/Provider -   [x] Speciality/Provider - [facility]     Has the patient tried other medications and failed? If failed, which medications did they fail?    [] No   [] Yes -     Is the patient's insurance updated in EPIC?   [] Yes   [] No     Is a copy of the patient's insurance scanned in EPIC?   [] Yes   [] No

## 2025-05-05 NOTE — TELEPHONE ENCOUNTER
Patient called in to check on the status of a PA for   Qulipta 30 mg. Patient made aware it was initiated on 5/1 and it's pending approval. Patient verbalized understanding. No further actions needed at the moment.

## 2025-05-06 NOTE — TELEPHONE ENCOUNTER
If [facility] has not replied to your standard request within 15 calendar days, or your urgent request within 72 hours, please contact Tony at [phone].    PA submitted on 5/1/25.

## 2025-05-06 NOTE — TELEPHONE ENCOUNTER
PA Key: [id number] for Qulipta 30mg has been approved from 5/6/25 - 12/31/2222     PA Case ID #: [id number]  Need Help? Call us at [phone]  Outcome  Approved today by [employer]  This case was approved from May 6 2025 4:00AM to Dec 31 2222 5:00AM.  Effective Date: 5/6/2025  Authorization Expiration Date: 12/31/2222    Called [facility]. Spoke w/pharmacist. She was able to receive a paid claim. $0 copay. They will contact patient.

## 2025-05-09 ENCOUNTER — OFFICE VISIT (OUTPATIENT)
Dept: URGENT CARE | Facility: MEDICAL CENTER | Age: 50
End: 2025-05-09
Payer: COMMERCIAL

## 2025-05-09 VITALS
BODY MASS INDEX: 26.39 KG/M2 | WEIGHT: 149 LBS | HEART RATE: 74 BPM | RESPIRATION RATE: 18 BRPM | OXYGEN SATURATION: 98 % | SYSTOLIC BLOOD PRESSURE: 122 MMHG | TEMPERATURE: 98 F | DIASTOLIC BLOOD PRESSURE: 71 MMHG

## 2025-05-09 DIAGNOSIS — J06.9 ACUTE UPPER RESPIRATORY INFECTION: Primary | ICD-10-CM

## 2025-05-09 PROCEDURE — G0382 LEV 3 HOSP TYPE B ED VISIT: HCPCS | Performed by: PHYSICIAN ASSISTANT

## 2025-05-09 NOTE — PROGRESS NOTES
[facility]        NAME: Melissa Harringotn is a 49 y.o. female  : 1975    MRN: [id number]  DATE: May 9, 2025  TIME: 6:46 PM      Assessment and Plan     Acute upper respiratory infection [J06.9]  1. Acute upper respiratory infection            POC Testing Results        Note:       Patient Instructions     Patient Instructions   You may take OTC cough medication such as Mucinex DM for cough/congestion.         Follow up with primary care provider.   Go to ER if symptoms worsen.    Chief Complaint     Chief Complaint   Patient presents with    Cough     Pt. With cough and congestion for a week. No fevers.          History of Present Illness     Pt reports cough and congestion x 1 week with SOB when coughing. She is taking OTC cough medication, Allegra and Tessalon. She presents today because the cough is persistent. She denies any fever, sore throat or known sick contacts.     Cough  Associated symptoms include shortness of breath. Pertinent negatives include no chest pain, chills, ear pain, eye redness, fever, rash, sore throat or wheezing.       Review of Systems     Review of Systems   Constitutional:  Negative for chills and fever.   HENT:  Positive for congestion. Negative for ear discharge, ear pain, sneezing, sore throat and voice change.    Eyes:  Negative for redness.   Respiratory:  Positive for cough and shortness of breath. Negative for wheezing.    Cardiovascular:  Negative for chest pain.   Gastrointestinal:  Negative for abdominal pain, diarrhea, nausea and vomiting.   Skin:  Negative for rash.   Neurological:  Negative for syncope.         Current Medications       Current Outpatient Medications:     Atogepant (Qulipta) 60 MG TABS, Take 60 mg daily, Disp: 90 tablet, Rfl: 3    cyclobenzaprine (FLEXERIL) 10 mg tablet, Take 1 tablet (10 mg total) by mouth 3 (three) times a day as needed for muscle spasms (Patient not taking: Reported on 2025), Disp: 20 tablet, Rfl: 0    diclofenac  (VOLTAREN) 75 mg EC tablet, Take 1 tablet (75 mg total) by mouth 2 (two) times a day (Patient not taking: Reported on 5/9/2025), Disp: 30 tablet, Rfl: 0    lamoTRIgine (LaMICtal) 25 MG CHEW, daily at bedtime, Disp: , Rfl:     LORazepam (ATIVAN) 1 mg tablet, Take 1 mg by mouth 2 (two) times a day  , Disp: , Rfl:     rosuvastatin (CRESTOR) 10 MG tablet, Take 1 tablet (10 mg total) by mouth daily at bedtime, Disp: 90 tablet, Rfl: 3    SUMAtriptan (IMITREX) 100 mg tablet, TAKE 1 TABLET BY MOUTH IF NEEDED FOR MIGRAINE (TAKE 1 TABLET AT THE START OF THE HEADACHE. IF NO RELIEF CAN REPEAT THE DOSE IN 2 HOURS BUT NOT MORE THAN 2 DOSES IN 24 HOURS), Disp: 9 tablet, Rfl: 3    traZODone (DESYREL) 100 mg tablet, Take 1 tablet (100 mg total) by mouth daily at bedtime, Disp: 30 tablet, Rfl: 5    Current Facility-Administered Medications:     Botulinum Toxin Type A SOLR 200 Units, 200 Units, Injection, Q3 Months, , 200 Units at 04/29/25 1109    Current Allergies     Allergies as of 05/09/2025    (No Known Allergies)              The following portions of the patient's history were reviewed and updated as appropriate: allergies, current medications, past family history, past medical history, past social history, past surgical history, and problem list.     Past Medical History:   Diagnosis Date    Anxiety     Constipation     Depression     Diverticulitis of colon     Family history of colonic polyps     GERD (gastroesophageal reflux disease)     Headache     Headache(784.0)     HL (hearing loss)     Hyperlipidemia     Migraine     Psychiatric disorder     Suicide and self-inflicted injury (HCC)     Syncope June 2022    Passed out       Past Surgical History:   Procedure Laterality Date    COLONOSCOPY      NO PAST SURGERIES         Family History   Problem Relation Age of Onset    COPD Mother     Migraines Mother     Depression Mother     Bipolar disorder Mother     Heart disease Father     Heart attack Father     Cancer Maternal  Grandmother     Breast cancer Maternal Grandmother     Cancer Maternal Grandfather     Colon cancer Maternal Grandfather     Cancer Maternal Aunt     COPD Maternal Uncle          Medications have been verified.        Objective     /71   Pulse 74   Temp 98 °F (36.7 °C)   Resp 18   Wt 67.6 kg (149 lb)   SpO2 98%   BMI 26.39 kg/m²   No LMP recorded.         Physical Exam     Physical Exam  Vitals and nursing note reviewed.   Constitutional:       General: She is not in acute distress.     Appearance: Normal appearance. She is not ill-appearing, toxic-appearing or diaphoretic.   HENT:      Head: Normocephalic and atraumatic.      Right Ear: Tympanic membrane, ear canal and external ear normal. There is no impacted cerumen.      Left Ear: Tympanic membrane, ear canal and external ear normal. There is no impacted cerumen.      Nose: Nose normal.      Mouth/Throat:      Lips: Pink. No lesions.      Mouth: Mucous membranes are moist.      Tongue: No lesions. Tongue does not deviate from midline.      Palate: No mass and lesions.      Pharynx: Oropharynx is clear. Uvula midline. No pharyngeal swelling, oropharyngeal exudate, posterior oropharyngeal erythema or uvula swelling.      Tonsils: No tonsillar exudate or tonsillar abscesses.   Eyes:      General: No scleral icterus.        Right eye: No discharge.         Left eye: No discharge.      Extraocular Movements: Extraocular movements intact.      Conjunctiva/sclera: Conjunctivae normal.      Pupils: Pupils are equal, round, and reactive to light.   Cardiovascular:      Rate and Rhythm: Normal rate and regular rhythm.      Heart sounds: Normal heart sounds. No murmur heard.     No friction rub. No gallop.   Pulmonary:      Effort: Pulmonary effort is normal. No respiratory distress.      Breath sounds: Normal breath sounds. No stridor. No wheezing, rhonchi or rales.   Musculoskeletal:      Cervical back: Normal range of motion and neck supple. No rigidity.    Lymphadenopathy:      Cervical: No cervical adenopathy.   Skin:     General: Skin is warm and dry.      Coloration: Skin is not jaundiced or pale.      Findings: No bruising, erythema, lesion or rash.   Neurological:      General: No focal deficit present.      Mental Status: She is alert and oriented to person, place, and time. Mental status is at baseline.   Psychiatric:         Mood and Affect: Mood normal.         Behavior: Behavior normal.         Thought Content: Thought content normal.         Judgment: Judgment normal.

## 2025-05-09 NOTE — PATIENT INSTRUCTIONS
You may take OTC cough medication such as Mucinex DM for cough/congestion.  You may continue to take Tessalon as needed for cough  You may also continue to take an antihistamine to dry up congestion such as Zyrtec, allegra or Benedryl  You may add on a decongestant such as Sudafed or Afrin  If symptoms do not improve in another week please return to our clinc

## 2025-07-03 ENCOUNTER — TELEPHONE (OUTPATIENT)
Age: 50
End: 2025-07-03

## 2025-07-03 NOTE — TELEPHONE ENCOUNTER
Pt called to confirm if July 7th follow up appt is needed. Pt has a botox appt coming up on August 13th and said she never usually sees provider that close to a botox appt. Please advise if pt needs to keep July 7th follow up.

## 2025-07-03 NOTE — TELEPHONE ENCOUNTER
Pt called back to if the 6 m follow up appt is necessary. Pt stated that there is no new symptoms or concerns.

## 2025-07-07 ENCOUNTER — OFFICE VISIT (OUTPATIENT)
Age: 50
End: 2025-07-07
Payer: COMMERCIAL

## 2025-07-07 VITALS
BODY MASS INDEX: 25.34 KG/M2 | DIASTOLIC BLOOD PRESSURE: 60 MMHG | HEIGHT: 63 IN | WEIGHT: 143 LBS | SYSTOLIC BLOOD PRESSURE: 90 MMHG | HEART RATE: 73 BPM

## 2025-07-07 DIAGNOSIS — G43.701 CHRONIC MIGRAINE WITHOUT AURA, WITH STATUS MIGRAINOSUS: Primary | ICD-10-CM

## 2025-07-07 DIAGNOSIS — R53.1 LEFT-SIDED WEAKNESS: ICD-10-CM

## 2025-07-07 PROCEDURE — 96372 THER/PROPH/DIAG INJ SC/IM: CPT | Performed by: PSYCHIATRY & NEUROLOGY

## 2025-07-07 PROCEDURE — 99215 OFFICE O/P EST HI 40 MIN: CPT | Performed by: PSYCHIATRY & NEUROLOGY

## 2025-07-07 RX ORDER — KETOROLAC TROMETHAMINE 10 MG/1
10 TABLET, FILM COATED ORAL EVERY 6 HOURS PRN
Qty: 10 TABLET | Refills: 0 | Status: SHIPPED | OUTPATIENT
Start: 2025-07-07

## 2025-07-07 RX ORDER — PROCHLORPERAZINE MALEATE 10 MG
10 TABLET ORAL EVERY 6 HOURS PRN
Qty: 30 TABLET | Refills: 0 | Status: SHIPPED | OUTPATIENT
Start: 2025-07-07

## 2025-07-07 RX ORDER — DEXAMETHASONE SODIUM PHOSPHATE 4 MG/ML
4 INJECTION, SOLUTION INTRA-ARTICULAR; INTRALESIONAL; INTRAMUSCULAR; INTRAVENOUS; SOFT TISSUE ONCE
Status: COMPLETED | OUTPATIENT
Start: 2025-07-07 | End: 2025-07-07

## 2025-07-07 RX ORDER — DIVALPROEX SODIUM 500 MG/1
500 TABLET, DELAYED RELEASE ORAL EVERY 6 HOURS PRN
Qty: 10 TABLET | Refills: 0 | Status: SHIPPED | OUTPATIENT
Start: 2025-07-07

## 2025-07-07 RX ORDER — DIPHENHYDRAMINE HCL 25 MG
25 CAPSULE ORAL EVERY 6 HOURS PRN
Qty: 30 CAPSULE | Refills: 0 | Status: SHIPPED | OUTPATIENT
Start: 2025-07-07

## 2025-07-07 RX ORDER — KETOROLAC TROMETHAMINE 30 MG/ML
30 INJECTION, SOLUTION INTRAMUSCULAR; INTRAVENOUS ONCE
Status: COMPLETED | OUTPATIENT
Start: 2025-07-07 | End: 2025-07-07

## 2025-07-07 RX ORDER — RIZATRIPTAN BENZOATE 10 MG/1
10 TABLET, ORALLY DISINTEGRATING ORAL AS NEEDED
Qty: 18 TABLET | Refills: 2 | Status: SHIPPED | OUTPATIENT
Start: 2025-07-07

## 2025-07-07 RX ORDER — DEXAMETHASONE 2 MG/1
TABLET ORAL
Qty: 16 TABLET | Refills: 0 | Status: SHIPPED | OUTPATIENT
Start: 2025-07-07

## 2025-07-07 RX ADMIN — DEXAMETHASONE SODIUM PHOSPHATE 4 MG: 4 INJECTION, SOLUTION INTRA-ARTICULAR; INTRALESIONAL; INTRAMUSCULAR; INTRAVENOUS; SOFT TISSUE at 09:25

## 2025-07-07 RX ADMIN — KETOROLAC TROMETHAMINE 30 MG: 30 INJECTION, SOLUTION INTRAMUSCULAR; INTRAVENOUS at 09:26

## 2025-07-07 NOTE — PROGRESS NOTES
NEUROLOGY OUTPATIENT - FOLLOW UP PATIENT VISIT NOTE      Name: Melissa Harrington       : 1975       MRN: [id number]   Encounter Provider: Misty Flores MD   Encounter Date: 2025  Encounter department: [facility] NEUROLOGY [facility]      History of Present Illness        INTERIM HISTORY:  Ms. Harrington is a 49 y.o. female presents with persistent headaches for the past couple of weeks. She is accompanied by her significant other.    The headaches are described as a lingering pressure that becomes achy, occurring every morning upon waking and lasting throughout the day with an intensity of 3 to 4 out of 10. These headaches differ from previous ones as they are not as debilitating and do not require isolation in darkness. She experiences nausea with the headaches and mild tingling in the upper face, which she attributes to the headache. No recent hangovers, but she acknowledges the headache feels similar to a 'hangover kind of a thing.'    She is currently taking Botox (2025), Qulipta 60 mg and sumatriptan 100 mg (Imitrex) for her headaches. Imitrex is effective in relieving the headache but causes stomach ache, leading her to delay taking it until the headache becomes severe. She drinks two Yetis of water daily, which are either 24 or 32 ounces each, and has been sleeping better, getting about seven hours of sleep with the help of trazodone.    She mentions feeling muscle tension in the front of her head and reports not having taken a break from work recently. She has a history of chronic lack of upper body strength, which has been a long-standing issue. She gets tired easily during her daily walks, even at the beginning of the walk. No new weakness, and her lack of upper body strength has been a chronic issue.        PRIOR NOTES    2025  Frequency of headaches days : Other months 5 headache days per month, but in the past month she has been having headaches 10 headache days. (Working  every day, stress from the holidays)  Intensity of the headaches days: about the same intensity     Medicine for headaches:   Qulipta 30 mg    Imitrex   Botox 10/22/2024     Side effects from the medications.denies having any side effects     Failed medications:   Topamax,   Imitrex  Maxalt  Pamelor     Any imaging including CTH or MRI of the brain: no acute findings. Some calcifications and a nasopharyngeal cyst.     Hydration: a lot of water  Sleep hygiene: stressful with holidays  Triggers for migraine headaches:stress, exhausted from work.        7/9/2024  Frequency of headaches days : 3-4 headaches per month which is a huge improvement from last time when she was getting daily headaches  Intensity of the headaches days: less intense     Medicine for headaches: Qulipta 30 mg  and imitex      Side effects from the medications.  Denies having any side effects from the medication     Failed medications: Topamax, Imitrex Maxalt and Pamelor     Any imaging including CTH or MRI of the brain: no acute findings. Some calcifications and a nasopharyngeal cyst.     3/28/2024  Since the last clinic visit patient reports that her Qulipta was not approved and she has been suffering from daily headaches  Frequency of headaches days : Daily headaches--the frequency seems to be getting worse  Intensity of the headaches days: The intensity seems to be getting worse  Medicine for headaches: Qulipta 30 mg (not approved by the insurance yet)  Side effects from the medications.  Failed medications: Topamax, Imitrex Maxalt and Pamelor.   Any imaging including CTH or MRI of the brain: no acute findings. Some calcifications and a nasopharyngeal cyst.   Feels a little bit down today secondary to getting the daily headaches as it is affecting her quality of life.  EEG which was ordered during the last clinic visit came back within normal limits      12/27/2023  HEADACHE DESCRIPTION:     Onset of headaches: several years back, but they got  worse in the last year or so.  Location of headaches: left-sided unilateral and occipital  Radiation: Yes   Quality of the pain: sharp and shooting  Intensity of the headache: At present: 6/10;  At its worst:  10/10  Duration of the headaches:hour(s)  Frequency of the headaches:daily  Presence of aura:  Yes, some ache in the back of the head  Associated symptoms with headaches: no vomiting , +nausea, +light sensitivity , and +sound sensitivity--She is also reporting some numbness in the left V1, tearing which lasts for couple of hours and then it gets better. Some visual complaints with her headaches. She is also having some memory complaints and has some problem with recall.   Triggers:No   Positional component: Yes, laying down makes them worse.    Alleviating factors: N/A   Any specific time of the day when get the headaches: no particular time of day     Family history of migraine headaches: Yes, mother had migraine headaches  History of neck and head trauma: No  Smoking status: No  Oral contraceptive use: N/A     Life style factors:  -Hydration: adequate  -Sleep: inadequate 4-5 hours  -Caffeine intake: 1 cups of ice tea at dinner  -Alcohol intake: none     Impact of headches:  -Number of headaches in past 30 days: 30/30 days.   -Number of days missed per month because of headache: No in the last 30 days.   -Number of ER visits for her headaches: No ER visit  in the last 30 days.         Treatment:  -Over the counter medications tried for headaches:   Tylenol been ineffective, Ibuprofen been ineffective, Naproxen been ineffective, and Excedrin migraine been ineffective --she has been taking it daily for the last many months?     -Prescription medications:  Abortive or Rescue Medications used:   Sumatriptan been helpful --She has been using the Imitrex about 9 tabs every month      Preventative or daily medications used for headaches:   Topamax been helpful but stopped it due to side effects           Red Flags  for headache:  Age <5 or >50: Yes  First worst headaches: No  Maximal at intensity: No  Change in pattern: Yes  New headache in pregnancy, cancer or immunocompromised patient: No  Loss of consciousness or convulsions: No  Headache triggered by exertion, Valsalva maneuver or sexual activity: No  Abnormal exam: please see below in Neurological examination.     [id number], summer 2022. She would know when she is going to pass out, black curtain in front of her eyes, no shaking episodes, loss of consciousness present, some confusion after wards, No tongue biting, no loss of bowel and bladder control. No hypoglycemic episodes reported.      OUTSIDE RECORDS:    historical medical records   Patient, 45 years of age and right-handed, with additional diagnoses of dyslipidemia, bipolar depression, and past nephrolithiasis, presents for further evaluation treatment regarding ongoing headache issues.  She was accompanied by her alexanceMechelle.  Patient relates the onset of her headaches to mid to late teen years.  Headaches have been for the most part stereotyped.  Generally right hemicranial although occasionally left hemicranial onset.  Described as pulsatile.  Associated with light and sound sensitivity, nausea and occasional vomiting.  Relates that occasionally the headaches are preceded by a visual aura.  In the recent past her headaches have escalated in frequency to the point where she was experiencing prior to the initiation of propranolol by the PCP 3-4 headaches on a weekly basis.  Per protocol has been initiated.  Most recently at a dose of 80 mg daily.  On the propranolol she has had a significant reduction in frequency to now perhaps 3-4 migraines per month.  However, her main issue is with significant headache issues during her menses.  On questioning she does have a tendency to fluid retention during her menses.  Her headaches do respond to sumatriptan.  Tolerating her current propranolol schedule with no reported  "adverse side effects.  Does report a family history of migraine (mother).  CURRENT OUTPATIENT MEDICATIONS:    Melissa Harrington has a current medication list which includes the following prescription(s): qulipta, cyclobenzaprine, diclofenac, lamotrigine, lorazepam, rosuvastatin, sumatriptan, and trazodone, and the following Facility-Administered Medications: botulinum toxin type a.       Objective     PHYSICAL EXAMINATION:   VITAL SIGNS:  height is 5' 3\" (1.6 m) and weight is 64.9 kg (143 lb). Her blood pressure is 90/60 and her pulse is 73.        NEUROLOGICAL EXAMINATION:      MENTAL STATUS: Alert and oriented x3, Language is fluent with no speech impairment  CRANIAL NERVES: Cranial nerves II-XII intact except for mild numbness in bilateral trigeminal V1 distribution, Extra-ocular movements intact, Midline tongue protrusion  MOTOR: 4+/5 strength in left triceps and biceps, Mild weakness in left wrist extensors, 5/5 strength in all four extremities, 5/5 shoulder shrug bilaterally  REFLEXES: Slightly brisk patellar reflexes bilaterally, Toes downgoing, 3+ reflexes in biceps, triceps, and brachioradialis  SENSATION: Sensation slightly decreased in left foot  COORDINATION: Normal finger to nose, no pronator drift     DIAGNOSTIC STUDIES:   PERTINENT LABS:     Lab Results   Component Value Date    WBC 4.7 03/17/2025     Lab Results   Component Value Date    HGB 12.9 03/17/2025     Lab Results   Component Value Date     03/17/2025     Lab Results   Component Value Date    LYMPHSABS 2.3 09/27/2021     No results found for: \"LABLYMP\"  Lab Results   Component Value Date    EOSABS 0.2 09/27/2021     No components found for: \"NEUTROPHILS\"    No results found for: \"LABMONO\"         No results found for: \"LABGLUC\"  Lab Results   Component Value Date    CREATININE 0.73 03/17/2025     Lab Results   Component Value Date    AST 21 03/17/2025     Lab Results   Component Value Date    ALT 9 03/17/2025     Lab Results   Component " "Value Date    ALKPHOS 42 (L) 05/15/2020     Lab Results   Component Value Date    AST 21 03/17/2025        No results found for: \"FOLATE\"       Lab Results   Component Value Date    TSH 0.997 03/17/2025            NEUROIMAGING:  Results for orders placed during the hospital encounter of 01/24/24    MRI brain with and without contrast    Impression  No mass effect, acute intracranial hemorrhage or evidence of recent infarction. No abnormal parenchymal or leptomeningeal enhancement identified.    T1 shortening within the left thalamus is consistent with paradoxical calcification as correlated with prior CT. Findings are stable since 2011.    Prominent left Virchow-Jakub space is also noted and stable since 2011.    Workstation performed: [id number]      CT BRAIN - WITHOUT CONTRAST     INDICATION:   head injury LOC.     COMPARISON:  August 20, 2018     TECHNIQUE:  CT examination of the brain was performed.  In addition to axial images, coronal 2D reformatted images were created and submitted for interpretation.       Radiation dose length product (DLP) for this visit:  947.96 mGy-cm .  This examination, like all CT scans performed in the [facility], was performed utilizing techniques to minimize radiation dose exposure, including the use of iterative   reconstruction and automated exposure control.       IMAGE QUALITY:  Diagnostic.     FINDINGS:     PARENCHYMA:  No intracranial mass, mass effect or midline shift. No CT signs of acute infarction.  No acute parenchymal hemorrhage.  Stable lacunar infarcts in the left basal ganglion. Stable coarse calcifications in the basal ganglia more pronounced on   the left than right     VENTRICLES AND EXTRA-AXIAL SPACES:  Normal for the patient's age.     VISUALIZED ORBITS AND PARANASAL SINUSES:  Unremarkable.     CALVARIUM AND EXTRACRANIAL SOFT TISSUES:  Normal.        IMPRESSION:  No acute intracranial abnormality.     EEG Interpretation:  This 41-60 minute EEG  " recorded during wakefulness, drowsiness, and sleep is normal.  However, normal EEG doesn't rule out seizure disorder. Clinical correlation is recommended.              Assessment & Plan  Chronic migraine without aura, with status migrainosus  Ms. Harrington is a very pleasant 49 y.o. female presenting with migraine headaches follow up. She had  daily morning headaches for the past couple of weeks, described as pressure-like and achy with an intensity of 3-4/10, lasting throughout the day. Likely exacerbated by stress and the upcoming Botox treatment. Nausea accompanies headaches, and sumatriptan causes stomach ache. Current treatment with Qulipta and sumatriptan is inadequate.    - Switch sumatriptan to Maxalt 10 mg, to be taken at the onset of headache symptoms  - Administer dexamethasone and Toradol injection in-office  - Prescribe oral migraine cocktail with instructions to alternate medications every six hours  - Schedule Botox treatment for 3 months.   - Encourage hydration and adequate sleep      Orders:    rizatriptan (Maxalt-MLT) 10 mg disintegrating tablet; Take 1 tablet (10 mg total) by mouth as needed for migraine Take at the onset of migraine; if symptoms continue or return, may take another dose at least 2 hours after first dose. Take no more than 2 doses in a day.    ketorolac (TORADOL) 10 mg tablet; Take 1 tablet (10 mg total) by mouth every 6 (six) hours as needed for moderate pain or severe pain    prochlorperazine (COMPAZINE) 10 mg tablet; Take 1 tablet (10 mg total) by mouth every 6 (six) hours as needed for nausea or vomiting    diphenhydrAMINE (Benadryl Allergy) 25 mg capsule; Take 1 capsule (25 mg total) by mouth every 6 (six) hours as needed for itching    divalproex sodium (Depakote) 500 mg DR tablet; Take 1 tablet (500 mg total) by mouth every 6 (six) hours as needed (Headaches)    dexamethasone (DECADRON) 2 mg tablet; Take 4 mg (2 tabs) for 2 days, then decrease it to 2 mg (1 tab) for 2 days,  then decrease it to 1 mg (0.5 tab) for 2 days and then stop the medicine.    dexamethasone (DECADRON) injection 4 mg    ketorolac (TORADOL) injection 30 mg    Left-sided weakness  Chronic left-sided upper extremity weakness, more pronounced in the left triceps and biceps. Longstanding as reported by her significant other. Mild sensory disturbance in the left foot with slightly decreased sensation.  - Monitor sensory disturbance in the left foot  - Order MRI to evaluate for underlying pathology  - Consider referral to physical therapy for strength training      Orders:    MRI brain w wo contrast; Future    Ambulatory referral to Physical Therapy; Future              Return in about 4 months (around 11/7/2025).       Administrative Statements     The management plan was discussed in detail with the patient and all questions were answered.     Ms. Harrington was encouraged to contact our office with any questions or concerns and to contact the clinic or go to the nearest emergency room if symptoms change or worsen.     I have spent a total time of 42 minutes in caring for this patient on the day of the visit/encounter including Diagnostic results, Prognosis, Risks and benefits of tx options, Instructions for management, Patient and family education, Importance of tx compliance, Risk factor reductions, Impressions, Counseling / Coordination of care, Documenting in the medical record, Reviewing/placing orders in the medical record (including tests, medications, and/or procedures), Obtaining or reviewing history and Communicating with other healthcare professionals .         Misty Guevara MD.   Staff Neurologist,   Neuroimmunology and Neuroinfectious disease  07/07/25     This report has been created through the use of voice recognition/text compilation software.  Typographical and content errors may occur with this process.  While efforts are made to detect and correct such errors, in some cases errors will persist.   For this reason, wording in this document should be considered in the proper context and not strictly verbatim.  If, when reviewing the document, an error is discovered, please let the office know at [phone]

## 2025-07-07 NOTE — PATIENT INSTRUCTIONS
Migraine cocktail:  Start with Toradol and Compazine and then use Depakote and dexamethasone at 6 hours.  If headaches break then can continue with the dexamethasone taper but if not then repeat the same cycle again every 6 hours.         ketorolac (TORADOL) 10 mg tablet; Take 1 tablet (10 mg total) by mouth every 6 (six) hours as needed for moderate pain or severe pain    prochlorperazine (COMPAZINE) 10 mg tablet; Take 1 tablet (10 mg total) by mouth every 6 (six) hours as needed for nausea or vomiting    diphenhydrAMINE (Benadryl Allergy) 25 mg capsule; Take 1 capsule (25 mg total) by mouth every 6 (six) hours as needed for itching    divalproex sodium (Depakote) 500 mg DR tablet; Take 1 tablet (500 mg total) by mouth every 6 (six) hours as needed (Headaches)    dexamethasone (DECADRON) 2 mg tablet; Take 4 mg (2 tabs) for 2 days, then decrease it to 2 mg (1 tab) for 2 days, then decrease it to 1 mg (0.5 tab) for 2 days and then stop the medicine.

## 2025-07-08 PROBLEM — G43.701 CHRONIC MIGRAINE WITHOUT AURA, WITH STATUS MIGRAINOSUS: Status: ACTIVE | Noted: 2025-07-08

## 2025-07-08 PROBLEM — R53.1 LEFT-SIDED WEAKNESS: Status: ACTIVE | Noted: 2025-07-08

## 2025-07-08 NOTE — ASSESSMENT & PLAN NOTE
Ms. Harrington is a very pleasant 49 y.o. female presenting with migraine headaches follow up. She had  daily morning headaches for the past couple of weeks, described as pressure-like and achy with an intensity of 3-4/10, lasting throughout the day. Likely exacerbated by stress and the upcoming Botox treatment. Nausea accompanies headaches, and sumatriptan causes stomach ache. Current treatment with Qulipta and sumatriptan is inadequate.    - Switch sumatriptan to Maxalt 10 mg, to be taken at the onset of headache symptoms  - Administer dexamethasone and Toradol injection in-office  - Prescribe oral migraine cocktail with instructions to alternate medications every six hours  - Schedule Botox treatment for 3 months.   - Encourage hydration and adequate sleep      Orders:    rizatriptan (Maxalt-MLT) 10 mg disintegrating tablet; Take 1 tablet (10 mg total) by mouth as needed for migraine Take at the onset of migraine; if symptoms continue or return, may take another dose at least 2 hours after first dose. Take no more than 2 doses in a day.    ketorolac (TORADOL) 10 mg tablet; Take 1 tablet (10 mg total) by mouth every 6 (six) hours as needed for moderate pain or severe pain    prochlorperazine (COMPAZINE) 10 mg tablet; Take 1 tablet (10 mg total) by mouth every 6 (six) hours as needed for nausea or vomiting    diphenhydrAMINE (Benadryl Allergy) 25 mg capsule; Take 1 capsule (25 mg total) by mouth every 6 (six) hours as needed for itching    divalproex sodium (Depakote) 500 mg DR tablet; Take 1 tablet (500 mg total) by mouth every 6 (six) hours as needed (Headaches)    dexamethasone (DECADRON) 2 mg tablet; Take 4 mg (2 tabs) for 2 days, then decrease it to 2 mg (1 tab) for 2 days, then decrease it to 1 mg (0.5 tab) for 2 days and then stop the medicine.    dexamethasone (DECADRON) injection 4 mg    ketorolac (TORADOL) injection 30 mg

## 2025-07-08 NOTE — ASSESSMENT & PLAN NOTE
Chronic left-sided upper extremity weakness, more pronounced in the left triceps and biceps. Longstanding as reported by her significant other. Mild sensory disturbance in the left foot with slightly decreased sensation.  - Monitor sensory disturbance in the left foot  - Order MRI to evaluate for underlying pathology  - Consider referral to physical therapy for strength training      Orders:    MRI brain w wo contrast; Future    Ambulatory referral to Physical Therapy; Future

## 2025-07-23 ENCOUNTER — TELEPHONE (OUTPATIENT)
Age: 50
End: 2025-07-23

## 2025-07-23 NOTE — TELEPHONE ENCOUNTER
Botox number of units: 200  Botox quantity: 1  Arrived at what location: [place]  Botox at Correct Administering Location: yes  NDC number: 6343-1820-75  Lot number: [id number]  Expiration Date: 11/2027  Appt notes indicate correct medication: yes

## 2025-08-04 ENCOUNTER — PATIENT MESSAGE (OUTPATIENT)
Age: 50
End: 2025-08-04

## 2025-08-04 DIAGNOSIS — G43.009 MIGRAINE WITHOUT AURA AND WITHOUT STATUS MIGRAINOSUS, NOT INTRACTABLE: ICD-10-CM

## 2025-08-06 RX ORDER — SUMATRIPTAN SUCCINATE 100 MG/1
TABLET ORAL
Qty: 9 TABLET | Refills: 0 | Status: SHIPPED | OUTPATIENT
Start: 2025-08-06 | End: 2025-08-13

## 2025-08-13 ENCOUNTER — PATIENT MESSAGE (OUTPATIENT)
Age: 50
End: 2025-08-13

## 2025-08-13 ENCOUNTER — PROCEDURE VISIT (OUTPATIENT)
Age: 50
End: 2025-08-13
Payer: COMMERCIAL